# Patient Record
Sex: MALE | Race: WHITE | URBAN - METROPOLITAN AREA
[De-identification: names, ages, dates, MRNs, and addresses within clinical notes are randomized per-mention and may not be internally consistent; named-entity substitution may affect disease eponyms.]

---

## 2022-10-07 ENCOUNTER — HOSPITAL ENCOUNTER (INPATIENT)
Age: 52
LOS: 2 days | Discharge: HOME | DRG: 247 | End: 2022-10-09
Attending: EMERGENCY MEDICINE | Admitting: INTERNAL MEDICINE

## 2022-10-07 DIAGNOSIS — I21.3 ST ELEVATION MYOCARDIAL INFARCTION (STEMI), UNSPECIFIED ARTERY (HCC): Primary | ICD-10-CM

## 2022-10-07 PROBLEM — D72.9 NEUTROPHILIC LEUKOCYTOSIS: Status: ACTIVE | Noted: 2022-10-07

## 2022-10-07 PROBLEM — R03.0 ELEVATED BLOOD PRESSURE READING WITHOUT DIAGNOSIS OF HYPERTENSION: Status: ACTIVE | Noted: 2022-10-07

## 2022-10-07 PROBLEM — E87.6 HYPOKALEMIA: Status: ACTIVE | Noted: 2022-10-07

## 2022-10-07 PROBLEM — D72.828 NEUTROPHILIC LEUKOCYTOSIS: Status: ACTIVE | Noted: 2022-10-07

## 2022-10-07 PROBLEM — R73.9 HYPERGLYCEMIA: Status: ACTIVE | Noted: 2022-10-07

## 2022-10-07 PROBLEM — I21.19 ACUTE ST ELEVATION MYOCARDIAL INFARCTION (STEMI) INVOLVING OTHER CORONARY ARTERY OF INFERIOR WALL (HCC): Status: ACTIVE | Noted: 2022-10-07

## 2022-10-07 LAB
ANION GAP SERPL CALCULATED.3IONS-SCNC: 13 MMOL/L (ref 3–16)
APTT: 25.8 SEC (ref 23–34.3)
BASOPHILS ABSOLUTE: 0.1 K/UL (ref 0–0.2)
BASOPHILS RELATIVE PERCENT: 0.6 %
BUN BLDV-MCNC: 14 MG/DL (ref 7–20)
CALCIUM SERPL-MCNC: 9 MG/DL (ref 8.3–10.6)
CHLORIDE BLD-SCNC: 99 MMOL/L (ref 99–110)
CO2: 23 MMOL/L (ref 21–32)
CREAT SERPL-MCNC: 0.9 MG/DL (ref 0.9–1.3)
EKG ATRIAL RATE: 56 BPM
EKG DIAGNOSIS: NORMAL
EKG P AXIS: 65 DEGREES
EKG P-R INTERVAL: 166 MS
EKG Q-T INTERVAL: 376 MS
EKG QRS DURATION: 84 MS
EKG QTC CALCULATION (BAZETT): 362 MS
EKG R AXIS: 48 DEGREES
EKG T AXIS: 72 DEGREES
EKG VENTRICULAR RATE: 56 BPM
EOSINOPHILS ABSOLUTE: 0.5 K/UL (ref 0–0.6)
EOSINOPHILS RELATIVE PERCENT: 2.7 %
GFR AFRICAN AMERICAN: >60
GFR NON-AFRICAN AMERICAN: >60
GLUCOSE BLD-MCNC: 144 MG/DL (ref 70–99)
HCT VFR BLD CALC: 46.2 % (ref 40.5–52.5)
HEMOGLOBIN: 15.5 G/DL (ref 13.5–17.5)
INR BLD: 1.05 (ref 0.87–1.14)
LYMPHOCYTES ABSOLUTE: 6.4 K/UL (ref 1–5.1)
LYMPHOCYTES RELATIVE PERCENT: 34.3 %
MAGNESIUM: 2 MG/DL (ref 1.8–2.4)
MCH RBC QN AUTO: 32.1 PG (ref 26–34)
MCHC RBC AUTO-ENTMCNC: 33.6 G/DL (ref 31–36)
MCV RBC AUTO: 95.6 FL (ref 80–100)
MONOCYTES ABSOLUTE: 1.4 K/UL (ref 0–1.3)
MONOCYTES RELATIVE PERCENT: 7.2 %
NEUTROPHILS ABSOLUTE: 10.4 K/UL (ref 1.7–7.7)
NEUTROPHILS RELATIVE PERCENT: 55.2 %
PDW BLD-RTO: 12.8 % (ref 12.4–15.4)
PLATELET # BLD: 375 K/UL (ref 135–450)
PMV BLD AUTO: 9.1 FL (ref 5–10.5)
POTASSIUM REFLEX MAGNESIUM: 3.4 MMOL/L (ref 3.5–5.1)
PROTHROMBIN TIME: 13.6 SEC (ref 11.7–14.5)
RBC # BLD: 4.83 M/UL (ref 4.2–5.9)
SODIUM BLD-SCNC: 135 MMOL/L (ref 136–145)
TROPONIN: <0.01 NG/ML
WBC # BLD: 18.8 K/UL (ref 4–11)

## 2022-10-07 PROCEDURE — 85730 THROMBOPLASTIN TIME PARTIAL: CPT

## 2022-10-07 PROCEDURE — 2500000003 HC RX 250 WO HCPCS

## 2022-10-07 PROCEDURE — C1874 STENT, COATED/COV W/DEL SYS: HCPCS

## 2022-10-07 PROCEDURE — 6360000002 HC RX W HCPCS

## 2022-10-07 PROCEDURE — 2709999900 HC NON-CHARGEABLE SUPPLY

## 2022-10-07 PROCEDURE — 6360000004 HC RX CONTRAST MEDICATION: Performed by: STUDENT IN AN ORGANIZED HEALTH CARE EDUCATION/TRAINING PROGRAM

## 2022-10-07 PROCEDURE — 99152 MOD SED SAME PHYS/QHP 5/>YRS: CPT

## 2022-10-07 PROCEDURE — 6370000000 HC RX 637 (ALT 250 FOR IP): Performed by: STUDENT IN AN ORGANIZED HEALTH CARE EDUCATION/TRAINING PROGRAM

## 2022-10-07 PROCEDURE — 96374 THER/PROPH/DIAG INJ IV PUSH: CPT

## 2022-10-07 PROCEDURE — B2111ZZ FLUOROSCOPY OF MULTIPLE CORONARY ARTERIES USING LOW OSMOLAR CONTRAST: ICD-10-PCS | Performed by: STUDENT IN AN ORGANIZED HEALTH CARE EDUCATION/TRAINING PROGRAM

## 2022-10-07 PROCEDURE — 92941 PRQ TRLML REVSC TOT OCCL AMI: CPT

## 2022-10-07 PROCEDURE — C1725 CATH, TRANSLUMIN NON-LASER: HCPCS

## 2022-10-07 PROCEDURE — C1769 GUIDE WIRE: HCPCS

## 2022-10-07 PROCEDURE — 99285 EMERGENCY DEPT VISIT HI MDM: CPT

## 2022-10-07 PROCEDURE — 80048 BASIC METABOLIC PNL TOTAL CA: CPT

## 2022-10-07 PROCEDURE — 85347 COAGULATION TIME ACTIVATED: CPT

## 2022-10-07 PROCEDURE — 83735 ASSAY OF MAGNESIUM: CPT

## 2022-10-07 PROCEDURE — 84484 ASSAY OF TROPONIN QUANT: CPT

## 2022-10-07 PROCEDURE — 93005 ELECTROCARDIOGRAM TRACING: CPT | Performed by: STUDENT IN AN ORGANIZED HEALTH CARE EDUCATION/TRAINING PROGRAM

## 2022-10-07 PROCEDURE — 85025 COMPLETE CBC W/AUTO DIFF WBC: CPT

## 2022-10-07 PROCEDURE — 4A023N7 MEASUREMENT OF CARDIAC SAMPLING AND PRESSURE, LEFT HEART, PERCUTANEOUS APPROACH: ICD-10-PCS | Performed by: STUDENT IN AN ORGANIZED HEALTH CARE EDUCATION/TRAINING PROGRAM

## 2022-10-07 PROCEDURE — 85610 PROTHROMBIN TIME: CPT

## 2022-10-07 PROCEDURE — 027034Z DILATION OF CORONARY ARTERY, ONE ARTERY WITH DRUG-ELUTING INTRALUMINAL DEVICE, PERCUTANEOUS APPROACH: ICD-10-PCS | Performed by: STUDENT IN AN ORGANIZED HEALTH CARE EDUCATION/TRAINING PROGRAM

## 2022-10-07 PROCEDURE — 93454 CORONARY ARTERY ANGIO S&I: CPT

## 2022-10-07 PROCEDURE — 2000000000 HC ICU R&B

## 2022-10-07 PROCEDURE — 99153 MOD SED SAME PHYS/QHP EA: CPT

## 2022-10-07 PROCEDURE — 36415 COLL VENOUS BLD VENIPUNCTURE: CPT

## 2022-10-07 PROCEDURE — 6370000000 HC RX 637 (ALT 250 FOR IP)

## 2022-10-07 PROCEDURE — 2580000003 HC RX 258

## 2022-10-07 PROCEDURE — C1887 CATHETER, GUIDING: HCPCS

## 2022-10-07 PROCEDURE — 6360000002 HC RX W HCPCS: Performed by: STUDENT IN AN ORGANIZED HEALTH CARE EDUCATION/TRAINING PROGRAM

## 2022-10-07 RX ORDER — ONDANSETRON 4 MG/1
4 TABLET, ORALLY DISINTEGRATING ORAL EVERY 8 HOURS PRN
Status: DISCONTINUED | OUTPATIENT
Start: 2022-10-07 | End: 2022-10-09 | Stop reason: HOSPADM

## 2022-10-07 RX ORDER — CLOPIDOGREL BISULFATE 75 MG/1
75 TABLET ORAL DAILY
Status: DISCONTINUED | OUTPATIENT
Start: 2022-10-07 | End: 2022-10-07

## 2022-10-07 RX ORDER — CARVEDILOL 6.25 MG/1
3.12 TABLET ORAL 2 TIMES DAILY WITH MEALS
Status: DISCONTINUED | OUTPATIENT
Start: 2022-10-07 | End: 2022-10-07

## 2022-10-07 RX ORDER — ACETAMINOPHEN 650 MG/1
650 SUPPOSITORY RECTAL EVERY 6 HOURS PRN
Status: DISCONTINUED | OUTPATIENT
Start: 2022-10-07 | End: 2022-10-07 | Stop reason: SDUPTHER

## 2022-10-07 RX ORDER — ACETAMINOPHEN 650 MG/1
650 SUPPOSITORY RECTAL EVERY 6 HOURS PRN
Status: DISCONTINUED | OUTPATIENT
Start: 2022-10-07 | End: 2022-10-09 | Stop reason: HOSPADM

## 2022-10-07 RX ORDER — ENOXAPARIN SODIUM 100 MG/ML
40 INJECTION SUBCUTANEOUS DAILY
Status: DISCONTINUED | OUTPATIENT
Start: 2022-10-08 | End: 2022-10-07

## 2022-10-07 RX ORDER — LISINOPRIL 10 MG/1
5 TABLET ORAL DAILY
Status: DISCONTINUED | OUTPATIENT
Start: 2022-10-07 | End: 2022-10-09 | Stop reason: HOSPADM

## 2022-10-07 RX ORDER — MAGNESIUM HYDROXIDE/ALUMINUM HYDROXICE/SIMETHICONE 120; 1200; 1200 MG/30ML; MG/30ML; MG/30ML
30 SUSPENSION ORAL EVERY 6 HOURS PRN
Status: DISCONTINUED | OUTPATIENT
Start: 2022-10-07 | End: 2022-10-09 | Stop reason: HOSPADM

## 2022-10-07 RX ORDER — SODIUM CHLORIDE 9 MG/ML
INJECTION, SOLUTION INTRAVENOUS PRN
Status: DISCONTINUED | OUTPATIENT
Start: 2022-10-07 | End: 2022-10-09 | Stop reason: HOSPADM

## 2022-10-07 RX ORDER — ASPIRIN 81 MG/1
81 TABLET, CHEWABLE ORAL DAILY
Status: DISCONTINUED | OUTPATIENT
Start: 2022-10-08 | End: 2022-10-09 | Stop reason: HOSPADM

## 2022-10-07 RX ORDER — POLYETHYLENE GLYCOL 3350 17 G/17G
17 POWDER, FOR SOLUTION ORAL DAILY PRN
Status: DISCONTINUED | OUTPATIENT
Start: 2022-10-07 | End: 2022-10-07

## 2022-10-07 RX ORDER — HEPARIN SODIUM 1000 [USP'U]/ML
4000 INJECTION, SOLUTION INTRAVENOUS; SUBCUTANEOUS ONCE
Status: COMPLETED | OUTPATIENT
Start: 2022-10-07 | End: 2022-10-07

## 2022-10-07 RX ORDER — ACETAMINOPHEN 325 MG/1
650 TABLET ORAL EVERY 6 HOURS PRN
Status: DISCONTINUED | OUTPATIENT
Start: 2022-10-07 | End: 2022-10-09 | Stop reason: HOSPADM

## 2022-10-07 RX ORDER — FAMOTIDINE 20 MG/1
20 TABLET, FILM COATED ORAL 2 TIMES DAILY
Status: DISCONTINUED | OUTPATIENT
Start: 2022-10-08 | End: 2022-10-09 | Stop reason: HOSPADM

## 2022-10-07 RX ORDER — ATORVASTATIN CALCIUM 80 MG/1
80 TABLET, FILM COATED ORAL NIGHTLY
Status: DISCONTINUED | OUTPATIENT
Start: 2022-10-07 | End: 2022-10-09 | Stop reason: HOSPADM

## 2022-10-07 RX ORDER — POLYETHYLENE GLYCOL 3350 17 G/17G
17 POWDER, FOR SOLUTION ORAL DAILY PRN
Status: DISCONTINUED | OUTPATIENT
Start: 2022-10-07 | End: 2022-10-09 | Stop reason: HOSPADM

## 2022-10-07 RX ORDER — MORPHINE SULFATE 4 MG/ML
4 INJECTION, SOLUTION INTRAMUSCULAR; INTRAVENOUS ONCE
Status: COMPLETED | OUTPATIENT
Start: 2022-10-07 | End: 2022-10-07

## 2022-10-07 RX ORDER — POTASSIUM CHLORIDE 20 MEQ/1
40 TABLET, EXTENDED RELEASE ORAL PRN
Status: DISCONTINUED | OUTPATIENT
Start: 2022-10-07 | End: 2022-10-09 | Stop reason: HOSPADM

## 2022-10-07 RX ORDER — ACETAMINOPHEN 325 MG/1
650 TABLET ORAL EVERY 6 HOURS PRN
Status: DISCONTINUED | OUTPATIENT
Start: 2022-10-07 | End: 2022-10-07 | Stop reason: SDUPTHER

## 2022-10-07 RX ORDER — SODIUM CHLORIDE 0.9 % (FLUSH) 0.9 %
5-40 SYRINGE (ML) INJECTION EVERY 12 HOURS SCHEDULED
Status: DISCONTINUED | OUTPATIENT
Start: 2022-10-07 | End: 2022-10-09 | Stop reason: HOSPADM

## 2022-10-07 RX ORDER — POTASSIUM CHLORIDE 7.45 MG/ML
10 INJECTION INTRAVENOUS PRN
Status: DISCONTINUED | OUTPATIENT
Start: 2022-10-07 | End: 2022-10-09 | Stop reason: HOSPADM

## 2022-10-07 RX ORDER — ATORVASTATIN CALCIUM 40 MG/1
40 TABLET, FILM COATED ORAL NIGHTLY
Status: DISCONTINUED | OUTPATIENT
Start: 2022-10-07 | End: 2022-10-07

## 2022-10-07 RX ORDER — SODIUM CHLORIDE 0.9 % (FLUSH) 0.9 %
5-40 SYRINGE (ML) INJECTION PRN
Status: DISCONTINUED | OUTPATIENT
Start: 2022-10-07 | End: 2022-10-09 | Stop reason: HOSPADM

## 2022-10-07 RX ORDER — MAGNESIUM SULFATE IN WATER 40 MG/ML
2000 INJECTION, SOLUTION INTRAVENOUS PRN
Status: DISCONTINUED | OUTPATIENT
Start: 2022-10-07 | End: 2022-10-09 | Stop reason: HOSPADM

## 2022-10-07 RX ORDER — SODIUM CHLORIDE 0.9 % (FLUSH) 0.9 %
5-40 SYRINGE (ML) INJECTION PRN
Status: DISCONTINUED | OUTPATIENT
Start: 2022-10-07 | End: 2022-10-07 | Stop reason: SDUPTHER

## 2022-10-07 RX ORDER — SODIUM CHLORIDE 9 MG/ML
INJECTION, SOLUTION INTRAVENOUS PRN
Status: DISCONTINUED | OUTPATIENT
Start: 2022-10-07 | End: 2022-10-07 | Stop reason: SDUPTHER

## 2022-10-07 RX ORDER — ONDANSETRON 2 MG/ML
4 INJECTION INTRAMUSCULAR; INTRAVENOUS EVERY 6 HOURS PRN
Status: DISCONTINUED | OUTPATIENT
Start: 2022-10-07 | End: 2022-10-09 | Stop reason: HOSPADM

## 2022-10-07 RX ORDER — ENOXAPARIN SODIUM 100 MG/ML
40 INJECTION SUBCUTANEOUS DAILY
Status: DISCONTINUED | OUTPATIENT
Start: 2022-10-08 | End: 2022-10-09 | Stop reason: HOSPADM

## 2022-10-07 RX ORDER — SODIUM CHLORIDE 0.9 % (FLUSH) 0.9 %
5-40 SYRINGE (ML) INJECTION EVERY 12 HOURS SCHEDULED
Status: DISCONTINUED | OUTPATIENT
Start: 2022-10-07 | End: 2022-10-07 | Stop reason: SDUPTHER

## 2022-10-07 RX ADMIN — HEPARIN SODIUM 4000 UNITS: 1000 INJECTION INTRAVENOUS; SUBCUTANEOUS at 19:10

## 2022-10-07 RX ADMIN — SODIUM CHLORIDE, PRESERVATIVE FREE 10 ML: 5 INJECTION INTRAVENOUS at 21:35

## 2022-10-07 RX ADMIN — IOHEXOL 200 ML: 350 INJECTION, SOLUTION INTRAVENOUS at 19:59

## 2022-10-07 RX ADMIN — ATORVASTATIN CALCIUM 80 MG: 80 TABLET, FILM COATED ORAL at 21:35

## 2022-10-07 RX ADMIN — TICAGRELOR 180 MG: 90 TABLET ORAL at 19:11

## 2022-10-07 RX ADMIN — MORPHINE SULFATE 4 MG: 4 INJECTION INTRAVENOUS at 19:08

## 2022-10-07 RX ADMIN — LISINOPRIL 5 MG: 10 TABLET ORAL at 21:35

## 2022-10-07 ASSESSMENT — ENCOUNTER SYMPTOMS
STRIDOR: 0
EYES NEGATIVE: 1
CHEST TIGHTNESS: 1
WHEEZING: 0
CONSTIPATION: 0
ABDOMINAL PAIN: 0
SHORTNESS OF BREATH: 1
CHEST TIGHTNESS: 0
DIARRHEA: 0
SHORTNESS OF BREATH: 0
ALLERGIC/IMMUNOLOGIC NEGATIVE: 1
GASTROINTESTINAL NEGATIVE: 1
NAUSEA: 0
COUGH: 0

## 2022-10-07 ASSESSMENT — PAIN - FUNCTIONAL ASSESSMENT: PAIN_FUNCTIONAL_ASSESSMENT: 0-10

## 2022-10-07 NOTE — ED PROVIDER NOTES
ED Attending Attestation Note     Date of evaluation: 10/7/2022    This patient was seen by the resident. I have seen and examined the patient, agree with the workup, evaluation, management and diagnosis. The care plan has been discussed. I have reviewed the ECG and concur with the resident's interpretation. My assessment reveals a 53-year-old male who presents with a chief complaint of chest pain. Prehospital EKG concerning for inferior STEMI. STEMI and Cath Lab were activated prior to patient's arrival.  I spoke with cardiologist who agreed with going to the Cath Lab. Requested heparin, aspirin, Brilinta. Aspirin given prehospital.  Heparin and Brilinta ordered. Patient received these in the ER. Patient on exam has a normal heart and lung exam, appears uncomfortable but not diaphoretic. Kasia Avalos CRITICAL CARE TIME    Upon my evaluation, this patient had a critical illness or injury that acutely impaired one or more vital organ systems such that there was a high probability of imminent or life threatening deterioration without intervention. In this patient that illness with a high probability of imminent or life threatening deterioration was STEMI, which required my direct attention, intervention, and personal management. I have personally provided 20 minutes of critical care time exclusive of separately billed procedures and treating other patients. Critical care was necessary to treat or prevent imminent or life threatening deterioration of the following condition(s):  STEMI requiring cath lab activation . Critical care time was spent personally by me on the following activities:  This critical care time included review of prehospital EKG, discussion with interventional cardiologist, examining the patient; pulse oximetry; arranging urgent treatment with development of a management plan; evaluation of patient's response to treatment; frequent reassessment; and, discussions with other providers including consultants.        Johannah Cushing, MD  10/07/22 0817

## 2022-10-07 NOTE — ED PROVIDER NOTES
[address] [facility] RESIDENT NOTE       Date of evaluation: 10/7/2022    Chief Complaint     Chest Pain      of Present Illness     Jody Wan is a 45 y.o. male with no significant known past medical history who presents to the emergency department with 8 out of 10 midsternal chest pain that onset 10 minutes prior to arrival.  States he has never had this pain before. Does not follow-up with doctors regularly, but has never been told he has heart disease, hypertension, diabetes. Also reports shortness of breath but this pain. Otherwise he has been feeling well. No fevers, nausea, vomiting, abdominal pain, any other concerns. EKG in route showed inferior STEMI. Cath Lab was activated prior to arrival.  He was given 325 mg aspirin in route. Review of Systems     Review of Systems   HENT: Negative. Eyes: Negative. Respiratory:  Positive for chest tightness and shortness of breath. Negative for cough, wheezing and stridor. Cardiovascular:  Positive for chest pain. Negative for palpitations and leg swelling. Gastrointestinal: Negative. Endocrine: Negative. Genitourinary: Negative. Musculoskeletal: Negative. Skin: Negative. Allergic/Immunologic: Negative. Neurological: Negative. Hematological: Negative. Psychiatric/Behavioral: Negative. Past Medical, Surgical, Family, and Social History     He has no past medical history on file. He has a past surgical history that includes Testicle removal.  His family history is not on file. He reports that he has been smoking cigarettes. He has been smoking an average of 1.5 packs per day. He has never used smokeless tobacco. He reports that he does not currently use alcohol. He reports that he does not use drugs. Medications     Previous Medications    IBUPROFEN (IBU) 600 MG TABLET    Take 1 tablet by mouth every 6 hours as needed for Pain.        Allergies     He is allergic to tetanus toxoids. Physical Exam     INITIAL VITALS: BP: (!) 151/99,  , Heart Rate: 56, Resp: 16, SpO2: 97 %   Physical Exam  Vitals and nursing note reviewed. Constitutional:       General: He is not in acute distress. Appearance: Normal appearance. He is not ill-appearing, toxic-appearing or diaphoretic. Comments: Appears to be in distress secondary to pain   HENT:      Head: Normocephalic and atraumatic. Nose: Nose normal.      Mouth/Throat:      Mouth: Mucous membranes are moist.      Pharynx: Oropharynx is clear. Eyes:      Extraocular Movements: Extraocular movements intact. Pupils: Pupils are equal, round, and reactive to light. Cardiovascular:      Rate and Rhythm: Normal rate and regular rhythm. Pulses: Normal pulses. Heart sounds: No murmur heard. Pulmonary:      Effort: Pulmonary effort is normal.      Breath sounds: Normal breath sounds. Abdominal:      Palpations: Abdomen is soft. Tenderness: no abdominal tenderness   Musculoskeletal:         General: Normal range of motion. Cervical back: Normal range of motion and neck supple. Skin:     General: Skin is warm and dry. Capillary Refill: Capillary refill takes less than 2 seconds. Neurological:      General: No focal deficit present. Mental Status: He is alert and oriented to person, place, and time. Psychiatric:         Mood and Affect: Mood normal.         Behavior: Behavior normal.       DiagnosticResults     EKG   Interpreted in conjunction with emergencydepartment physician Kristen Garrett MD  Sinus bradycardia with ventricular rate of 56 bpm.  MD interval 166 ms. QTc 362 ms. Acute ST elevations in leads II, 3, aVF. ST depression in V2. Normal axis.     RADIOLOGY:  No orders to display       LABS:   Results for orders placed or performed during the hospital encounter of 10/07/22   EKG 12 Lead   Result Value Ref Range    Ventricular Rate 56 BPM    Atrial Rate 56 BPM    P-R Interval 166 ms    QRS Duration 84 ms    Q-T Interval 376 ms    QTc Calculation (Bazett) 362 ms    P Axis 65 degrees    R Axis 48 degrees    T Axis 72 degrees    Diagnosis       EKG performed in ER and to be interpreted by ER physician. Confirmed by MD, ER ([id number]),  Brian Zuñiga ([phone]) on 10/7/2022 7:03:53 PM       ED BEDSIDE ULTRASOUND:  No results found. RECENT VITALS:  BP: (!) 151/99,  , Heart Rate: 56,Resp: 16, SpO2: 97 %     Procedures       ED Course     Nursing Notes, Past Medical Hx, Past Surgical Hx, Social Hx, Allergies, and Family Hx were reviewed. The patient was given the followingmedications:  Orders Placed This Encounter   Medications    ticagrelor (BRILINTA) tablet 180 mg    heparin (porcine) injection 4,000 Units    morphine injection 4 mg       CONSULTS:  Tonia Govea / JOAQUIN / Keylanohemidulce Louie is a 45 y.o. male presenting to the emergency department with STEMI on EKG. He is hemodynamically stable. Cath Lab was activated. Patient was given aspirin in route. Given Brilinta and heparin here per cardiology recommendations. Given morphine for pain upon arrival.  Patient taken to Cath Lab. Case was discussed with medicine. Patient will require ICU admission after Cath Lab. He was accepted for admission. This patient was also evaluated by the attending physician. All care plans werediscussed and agreed upon. Clinical Impression     1. ST elevation myocardial infarction (STEMI), unspecified artery (HCC)        Disposition     PATIENT REFERRED TO:  No follow-up provider specified.     DISCHARGE MEDICATIONS:  New Prescriptions    No medications on file       Sendy Weiss MD  Resident  10/07/22 2026

## 2022-10-08 ENCOUNTER — APPOINTMENT (OUTPATIENT)
Dept: GENERAL RADIOLOGY | Age: 52
DRG: 247 | End: 2022-10-08

## 2022-10-08 PROBLEM — Z72.0 TOBACCO USE: Status: ACTIVE | Noted: 2022-10-08

## 2022-10-08 LAB
A/G RATIO: 1.7 (ref 1.1–2.2)
ALBUMIN SERPL-MCNC: 4.2 G/DL (ref 3.4–5)
ALP BLD-CCNC: 99 U/L (ref 40–129)
ALT SERPL-CCNC: 41 U/L (ref 10–40)
ANION GAP SERPL CALCULATED.3IONS-SCNC: 12 MMOL/L (ref 3–16)
AST SERPL-CCNC: 172 U/L (ref 15–37)
BASOPHILS ABSOLUTE: 0.1 K/UL (ref 0–0.2)
BASOPHILS RELATIVE PERCENT: 0.3 %
BILIRUB SERPL-MCNC: 0.5 MG/DL (ref 0–1)
BUN BLDV-MCNC: 12 MG/DL (ref 7–20)
CALCIUM SERPL-MCNC: 8.9 MG/DL (ref 8.3–10.6)
CHLORIDE BLD-SCNC: 102 MMOL/L (ref 99–110)
CHOLESTEROL, TOTAL: 170 MG/DL (ref 0–199)
CO2: 22 MMOL/L (ref 21–32)
CREAT SERPL-MCNC: 0.8 MG/DL (ref 0.9–1.3)
EOSINOPHILS ABSOLUTE: 0.1 K/UL (ref 0–0.6)
EOSINOPHILS RELATIVE PERCENT: 0.3 %
ESTIMATED AVERAGE GLUCOSE: 116.9 MG/DL
GFR AFRICAN AMERICAN: >60
GFR NON-AFRICAN AMERICAN: >60
GLUCOSE BLD-MCNC: 141 MG/DL (ref 70–99)
HBA1C MFR BLD: 5.7 %
HCT VFR BLD CALC: 43.6 % (ref 40.5–52.5)
HDLC SERPL-MCNC: 31 MG/DL (ref 40–60)
HEMOGLOBIN: 14.8 G/DL (ref 13.5–17.5)
LDL CHOLESTEROL CALCULATED: 116 MG/DL
LV EF: 53 %
LVEF MODALITY: NORMAL
LYMPHOCYTES ABSOLUTE: 3.2 K/UL (ref 1–5.1)
LYMPHOCYTES RELATIVE PERCENT: 17.7 %
MAGNESIUM: 2 MG/DL (ref 1.8–2.4)
MCH RBC QN AUTO: 32.6 PG (ref 26–34)
MCHC RBC AUTO-ENTMCNC: 34 G/DL (ref 31–36)
MCV RBC AUTO: 95.8 FL (ref 80–100)
MONOCYTES ABSOLUTE: 1.3 K/UL (ref 0–1.3)
MONOCYTES RELATIVE PERCENT: 7.1 %
NEUTROPHILS ABSOLUTE: 13.5 K/UL (ref 1.7–7.7)
NEUTROPHILS RELATIVE PERCENT: 74.6 %
PDW BLD-RTO: 12.9 % (ref 12.4–15.4)
PLATELET # BLD: 287 K/UL (ref 135–450)
PMV BLD AUTO: 8.6 FL (ref 5–10.5)
POTASSIUM REFLEX MAGNESIUM: 4.1 MMOL/L (ref 3.5–5.1)
RBC # BLD: 4.56 M/UL (ref 4.2–5.9)
SODIUM BLD-SCNC: 136 MMOL/L (ref 136–145)
TOTAL PROTEIN: 6.7 G/DL (ref 6.4–8.2)
TRIGL SERPL-MCNC: 113 MG/DL (ref 0–150)
TROPONIN: 3.88 NG/ML
VLDLC SERPL CALC-MCNC: 23 MG/DL
WBC # BLD: 18.2 K/UL (ref 4–11)

## 2022-10-08 PROCEDURE — 6370000000 HC RX 637 (ALT 250 FOR IP): Performed by: STUDENT IN AN ORGANIZED HEALTH CARE EDUCATION/TRAINING PROGRAM

## 2022-10-08 PROCEDURE — 6360000002 HC RX W HCPCS

## 2022-10-08 PROCEDURE — 71045 X-RAY EXAM CHEST 1 VIEW: CPT

## 2022-10-08 PROCEDURE — 6370000000 HC RX 637 (ALT 250 FOR IP): Performed by: NURSE PRACTITIONER

## 2022-10-08 PROCEDURE — 84484 ASSAY OF TROPONIN QUANT: CPT

## 2022-10-08 PROCEDURE — 1200000000 HC SEMI PRIVATE

## 2022-10-08 PROCEDURE — 2580000003 HC RX 258

## 2022-10-08 PROCEDURE — 80061 LIPID PANEL: CPT

## 2022-10-08 PROCEDURE — 94150 VITAL CAPACITY TEST: CPT

## 2022-10-08 PROCEDURE — 36415 COLL VENOUS BLD VENIPUNCTURE: CPT

## 2022-10-08 PROCEDURE — 83735 ASSAY OF MAGNESIUM: CPT

## 2022-10-08 PROCEDURE — 6370000000 HC RX 637 (ALT 250 FOR IP)

## 2022-10-08 PROCEDURE — 93306 TTE W/DOPPLER COMPLETE: CPT

## 2022-10-08 PROCEDURE — 85025 COMPLETE CBC W/AUTO DIFF WBC: CPT

## 2022-10-08 PROCEDURE — 83036 HEMOGLOBIN GLYCOSYLATED A1C: CPT

## 2022-10-08 PROCEDURE — 93005 ELECTROCARDIOGRAM TRACING: CPT | Performed by: STUDENT IN AN ORGANIZED HEALTH CARE EDUCATION/TRAINING PROGRAM

## 2022-10-08 PROCEDURE — 80053 COMPREHEN METABOLIC PANEL: CPT

## 2022-10-08 RX ORDER — ASPIRIN 81 MG/1
81 TABLET, CHEWABLE ORAL DAILY
Qty: 30 TABLET | Refills: 3 | Status: SHIPPED | OUTPATIENT
Start: 2022-10-09

## 2022-10-08 RX ORDER — CLOPIDOGREL BISULFATE 75 MG/1
300 TABLET ORAL ONCE
Status: COMPLETED | OUTPATIENT
Start: 2022-10-08 | End: 2022-10-08

## 2022-10-08 RX ORDER — LISINOPRIL 5 MG/1
5 TABLET ORAL DAILY
Qty: 30 TABLET | Refills: 3 | Status: SHIPPED | OUTPATIENT
Start: 2022-10-09

## 2022-10-08 RX ORDER — METOPROLOL SUCCINATE 25 MG/1
25 TABLET, EXTENDED RELEASE ORAL DAILY
Qty: 30 TABLET | Refills: 3 | Status: SHIPPED | OUTPATIENT
Start: 2022-10-08

## 2022-10-08 RX ORDER — NITROGLYCERIN 0.4 MG/1
0.4 TABLET SUBLINGUAL ONCE
Status: COMPLETED | OUTPATIENT
Start: 2022-10-08 | End: 2022-10-08

## 2022-10-08 RX ORDER — ATORVASTATIN CALCIUM 80 MG/1
80 TABLET, FILM COATED ORAL NIGHTLY
Qty: 30 TABLET | Refills: 3 | Status: SHIPPED | OUTPATIENT
Start: 2022-10-08

## 2022-10-08 RX ORDER — CLOPIDOGREL BISULFATE 75 MG/1
75 TABLET ORAL DAILY
Qty: 30 TABLET | Refills: 3 | Status: SHIPPED | OUTPATIENT
Start: 2022-10-09

## 2022-10-08 RX ORDER — METOPROLOL SUCCINATE 25 MG/1
25 TABLET, EXTENDED RELEASE ORAL DAILY
Status: DISCONTINUED | OUTPATIENT
Start: 2022-10-08 | End: 2022-10-09 | Stop reason: HOSPADM

## 2022-10-08 RX ORDER — CLOPIDOGREL BISULFATE 75 MG/1
75 TABLET ORAL DAILY
Status: DISCONTINUED | OUTPATIENT
Start: 2022-10-09 | End: 2022-10-09 | Stop reason: HOSPADM

## 2022-10-08 RX ADMIN — TICAGRELOR 90 MG: 90 TABLET ORAL at 00:36

## 2022-10-08 RX ADMIN — ASPIRIN 81 MG 81 MG: 81 TABLET ORAL at 09:50

## 2022-10-08 RX ADMIN — FAMOTIDINE 20 MG: 20 TABLET ORAL at 09:50

## 2022-10-08 RX ADMIN — FAMOTIDINE 20 MG: 20 TABLET ORAL at 20:57

## 2022-10-08 RX ADMIN — BENZOCAINE 6 MG-MENTHOL 10 MG LOZENGES 1 LOZENGE: at 05:14

## 2022-10-08 RX ADMIN — SODIUM CHLORIDE, PRESERVATIVE FREE 10 ML: 5 INJECTION INTRAVENOUS at 20:57

## 2022-10-08 RX ADMIN — ENOXAPARIN SODIUM 40 MG: 100 INJECTION SUBCUTANEOUS at 09:53

## 2022-10-08 RX ADMIN — CLOPIDOGREL BISULFATE 300 MG: 75 TABLET ORAL at 12:22

## 2022-10-08 RX ADMIN — ATORVASTATIN CALCIUM 80 MG: 80 TABLET, FILM COATED ORAL at 20:57

## 2022-10-08 RX ADMIN — NITROGLYCERIN 0.4 MG: 0.4 TABLET, ORALLY DISINTEGRATING SUBLINGUAL at 09:13

## 2022-10-08 RX ADMIN — LIDOCAINE HYDROCHLORIDE: 20 SOLUTION ORAL; TOPICAL at 12:21

## 2022-10-08 RX ADMIN — METOPROLOL SUCCINATE 25 MG: 25 TABLET, FILM COATED, EXTENDED RELEASE ORAL at 12:22

## 2022-10-08 RX ADMIN — TICAGRELOR 90 MG: 90 TABLET ORAL at 09:50

## 2022-10-08 RX ADMIN — SODIUM CHLORIDE, PRESERVATIVE FREE 10 ML: 5 INJECTION INTRAVENOUS at 09:53

## 2022-10-08 RX ADMIN — LISINOPRIL 5 MG: 10 TABLET ORAL at 09:50

## 2022-10-08 ASSESSMENT — PAIN DESCRIPTION - LOCATION
LOCATION: THROAT
LOCATION: CHEST
LOCATION: CHEST

## 2022-10-08 ASSESSMENT — PAIN - FUNCTIONAL ASSESSMENT: PAIN_FUNCTIONAL_ASSESSMENT: ACTIVITIES ARE NOT PREVENTED

## 2022-10-08 ASSESSMENT — ENCOUNTER SYMPTOMS
SHORTNESS OF BREATH: 1
ABDOMINAL DISTENTION: 0
PHOTOPHOBIA: 0
APNEA: 0
EYE DISCHARGE: 0

## 2022-10-08 ASSESSMENT — PAIN DESCRIPTION - DESCRIPTORS
DESCRIPTORS: SHARP
DESCRIPTORS: SHARP;OTHER (COMMENT)

## 2022-10-08 ASSESSMENT — PAIN SCALES - GENERAL
PAINLEVEL_OUTOF10: 0
PAINLEVEL_OUTOF10: 4
PAINLEVEL_OUTOF10: 4
PAINLEVEL_OUTOF10: 2
PAINLEVEL_OUTOF10: 3

## 2022-10-08 ASSESSMENT — PAIN DESCRIPTION - PAIN TYPE: TYPE: ACUTE PAIN

## 2022-10-08 ASSESSMENT — PAIN DESCRIPTION - FREQUENCY: FREQUENCY: CONTINUOUS

## 2022-10-08 ASSESSMENT — PAIN DESCRIPTION - ORIENTATION: ORIENTATION: LEFT

## 2022-10-08 NOTE — DISCHARGE INSTRUCTIONS
Please follow up with cardiology in 1 week.   Continue taking the medications you have been prescribed in the hospital.

## 2022-10-08 NOTE — PROGRESS NOTES
Pt stated that he was having \"chest pain\". He stated that he felt a sharp pain in his L chest whenever he breathed in. He stated that it has been happening for about 2hrs and was getting worse now. Pt rated the pain 4/10. Pt's other VSS WNL. This RN spoke with the ICU team and they assessed the Pt at his bedside. A STAT EKG was gotten which read normal. The Pt was given Nitro Sublingual x 1 and the pain was relieved.  Awaiting any new orders from the ICU team as they speak with the cardiologist.

## 2022-10-08 NOTE — PROGRESS NOTES
This RN called lab and asked about troponin level that was drawn around 1020 today. This RN had sent the level via the tube station and asked if there had been a result yet. No result was seen in results review yet, almost an hour after. Chemistry stated that they can see that it was received at 1050 but that they did not know where the tube was. He stated he would get back to me.

## 2022-10-08 NOTE — CONSULTS
[facility].        Chief Complaint   Patient presents with    Chest Pain            History of Present Illness:  Giuseppe Will is a 45 y.o. patient who presented to the hospital with complaints of Chest pain. I have been asked to provide consultation regarding further management and testing. Patient with acute onset chest pain that started about an hour ago - doesn't see doctors unclear true past medical history. EKG on route was consistent with an inferior STEMI - ASA/Ticagrelor/Heparin given in the ED and the patient was brought emergently to the lab    Past Medical History:   has no past medical history on file. Surgical History:   has a past surgical history that includes Testicle removal.     Social History:   reports that he has been smoking cigarettes. He has been smoking an average of 1.5 packs per day. He has never used smokeless tobacco. He reports that he does not currently use alcohol. He reports that he does not use drugs. Family History:  No family history of premature coronary artery disease, aortic disease, or valve disease. Home Medications:  Were reviewed and are listed in nursing record. and/or listed below  Prior to Admission medications    Medication Sig Start Date End Date Taking? Authorizing Provider   ibuprofen (IBU) 600 MG tablet Take 1 tablet by mouth every 6 hours as needed for Pain. 11/1/12   RDAHA Live - CNP        Current Medications:  No current facility-administered medications for this encounter. Current Outpatient Medications   Medication Sig Dispense Refill    ibuprofen (IBU) 600 MG tablet Take 1 tablet by mouth every 6 hours as needed for Pain.  20 tablet 0        Allergies:  Tetanus toxoids     Review of Systems:   + chest pain  All other systems reviewed and negative    Physical Examination:    Vitals:    10/07/22 1858   BP: (!) 151/99   Pulse: 56   Resp: 16   SpO2: 97%      Weight: 200 lb (90.7 kg)       Physical Exam  Vitals and nursing note reviewed. Constitutional:       Appearance: Normal appearance. HENT:      Nose: Nose normal.      Mouth/Throat:      Mouth: Mucous membranes are moist.   Eyes:      Pupils: Pupils are equal, round, and reactive to light. Cardiovascular:      Rate and Rhythm: Regular rhythm. Bradycardia present. Heart sounds: No murmur heard. Pulmonary:      Effort: Pulmonary effort is normal. No respiratory distress. Breath sounds: Normal breath sounds. Abdominal:      General: Abdomen is flat. There is no distension. Tenderness: There is no abdominal tenderness. Musculoskeletal:      Right lower leg: No edema. Left lower leg: No edema. Skin:     General: Skin is warm and dry. Neurological:      General: No focal deficit present. Mental Status: He is alert and oriented to person, place, and time.    Psychiatric:         Mood and Affect: Mood normal.         Behavior: Behavior normal.        Labs  CBC:   Lab Results   Component Value Date/Time    WBC 18.8 10/07/2022 07:32 PM    RBC 4.83 10/07/2022 07:32 PM    HGB 15.5 10/07/2022 07:32 PM    HCT 46.2 10/07/2022 07:32 PM    MCV 95.6 10/07/2022 07:32 PM    RDW 12.8 10/07/2022 07:32 PM     10/07/2022 07:32 PM     CMP:    Lab Results   Component Value Date/Time     10/07/2022 07:32 PM    K 3.4 10/07/2022 07:32 PM    CL 99 10/07/2022 07:32 PM    CO2 23 10/07/2022 07:32 PM    BUN 14 10/07/2022 07:32 PM    CREATININE 0.9 10/07/2022 07:32 PM    GFRAA >60 10/07/2022 07:32 PM    LABGLOM >60 10/07/2022 07:32 PM    GLUCOSE 144 10/07/2022 07:32 PM    CALCIUM 9.0 10/07/2022 07:32 PM     PT/INR:  No results found for: PTINR  Lab Results   Component Value Date    TROPONINI <0.01 10/07/2022       EKG:  I have reviewed EKG with the following interpretation:  Impression:  Inferior STEMI      Old notes reviewed  Telemetry reviewed  Ekg personally reviewed  Chest xray personally reviewed  Echo, cath, and   Medications and labs reviewed    Due to the high probability of clinically significant life threating deterioration of the patient's condition that required my urgent intervention, a total critical care time of >75 minutes was used. This time excludes any time that may have been spent performing procedures. This includes but not limited to vital sign monitoring, telemetry monitoring, continuous pulse oximety, IV medication, clinical response to the IV medications, documentation time , consultation time, interpretation of lab data, review of nursing notes and old record review. Assessment/Plan:  Principal Problem:  STEMI (ST elevation myocardial infarction) ([facility] [address]      Assessment: inferior STEMI s/p PCI with DESx1 to the RCA, doing very well - hemodynamically stable, radial access no acute issues    Plan:   - Admit to ICU  - Continue ASA  - Will need ticagrelor dose at midnight tonight and then again starting at 0900 per normal schedule  - High potency statin atorva 80mg  - Avoid beta blocker next 24 hours if possible  - Formal ECHO in AM  - Cardiac Rehab  - Patient to follow with me in office in 1-2 weeks post D/C      ---    I will address the patient's cardiac risk factors and adjusted pharmacologic treatment as needed. In addition, I have reinforced the need for patient directed risk factor modification. Tobacco use was discussed with the patient and educated on the negative effects. I have asked the patient to not utilize these agents. Thank you for allowing to us to participate in the kyara or Sudheer Kidd. Further evaluation will be based upon the patient's clinical course and testing results. All questions and concerns were addressed to the patient/family. Alternatives to my treatment were discussed. The note was completed using EMR. Every effort was made to ensure accuracy; however, inadvertent computerized transcription errors may be present.     Noy Bowen MD  Interventional Cardiology  10/7/2022  8:02 PM    [facility] [address] [place] [address], [address] [place] [address]  Ph: [phone]  Fax: [phone]    NOTE: This report was transcribed using voice recognition software. Every effort was made to ensure accuracy, however, inadvertent computerized transcription errors may be present.

## 2022-10-08 NOTE — PROGRESS NOTES
Still no call back from lab regarding troponin level that was sent around 1020 today but this RN. No result in results review seen. This RN spoke with the processing person and she stated that she would figure out what happened. After being on hold for a long time she came back and stated that they found the tube and that results would be done in about 30min. She stated that the tube was labeled with an canceled label. This RN labeled the tube with the Pt printed out label from the lab system that had an active order for troponin at time of labeling. Awaiting result.

## 2022-10-08 NOTE — PROCEDURES
Cardiac Catheterization Operative Report    Diandra Toney  [id number]  10/7/2022  Bennett Farooq MD    Patient has a diagnosis of STEMI. He was referred for cardiac catheterization to define coronary anatomy and perform primary PCI. Procedure Details  The risks, benefits, complications, treatment options, and expected outcomes were discussed with the patient. The patient and/or family concurred with the proposed plan, giving informed consent. Patient was brought to the cath lab after IV hydration was begun and oral premedication was given. He was further sedated with fentanyl and versed. He was prepped and draped in the usual manner. Using the modified Seldinger access technique, a 6 Maltese sheath was placed in the Right Radial artery. Heparin was administered. A left heart catheterization was done. Angiograms were also done. Sedation:   Start time: 1930  End time: 2000  2mg Versed / 100mcg Fentanyl  A trained independent observer was present to assist with monitoring sedated patient    Interventions:  PCI performed to the proximal RCA  3.0x22 mm Mora HUNTER  Post dilation with a 3.0mm NC  Excellent post angiographic result with BAILEY 3 flow    After the procedure was completed. sedation was stopped and the sheaths and catheters were all removed. Hemostasis was achieved with manual pressure.     Findings:    Left Main  Normal bifurcation without angiographically significant disease   LAD  Minor luminal irregularities   Circ  Small AV groove vessel  - 1st OM was large with moderate ostial/proximal disease 50%   RCA  Proximal 100% occlusion, culprit for presentation  - Recannulization with wire entry  - 3.0x22mm Resolute Mora HUNTER  - Post dilation with 3.0mm NC balloon    Excellent post angiographic result and BAILEY 3 flow   Post % Stenosis  0   Closure Device  Radial hemostasis   Complications  None     Conclusion:   - PCI performed to the RCA with DESx1  - ASA/Ticagrelor  - Start BB tomorrow  - High potency statin atorvastatin 80mg  - Post PCI follow up with me in 1-2 weeks. Estimated Blood Loss:  minimal         Complications:  None; patient tolerated the procedure well.            Disposition: PACU - hemodynamically stable           Condition: stable    Guzman Linda MD  Interventional Cardiology  10/7/2022  8:48 PM

## 2022-10-08 NOTE — PROGRESS NOTES
Cardiology - PROGRESS NOTE    Admit Date: 10/7/2022     Chief Complaint: inf STEMI     Interval History:   Patient seen and examined and notes reviewed. Patient is being followed for in STEMI. Patient had felt CP and bilat upper extremity pain. States he had an emesis and knew something was wrong. Came to the ED and found to have an inf STEMI. S/p LHC with PCI to RCA. R wrist soft, no hematoma, good pulse. Does c/o R wrist tenderness, some chest discomfort and fatigue. Placed on Brilinta post procedure. Patient does work but he has no insurance. States if Rx is too expensive then he would not get filled. Echo done - pending.      In: 0 [P.O.:590]  Out: 0    Wt Readings from Last 2 Encounters:   10/07/22 200 lb (90.7 kg)         Data:   Scheduled Meds:   Scheduled Meds:   GI cocktail   Oral Once    lisinopril  5 mg Oral Daily    sodium chloride flush  5-40 mL IntraVENous 2 times per day    enoxaparin  40 mg SubCUTAneous Daily    ticagrelor  90 mg Oral BID    aspirin  81 mg Oral Daily    atorvastatin  80 mg Oral Nightly    famotidine  20 mg Oral BID     Continuous Infusions:   sodium chloride       PRN Meds:.Benzocaine-Menthol, ondansetron **OR** ondansetron, acetaminophen **OR** acetaminophen, magnesium sulfate, potassium chloride **OR** potassium alternative oral replacement **OR** potassium chloride, aluminum & magnesium hydroxide-simethicone, sodium chloride flush, sodium chloride, perflutren lipid microspheres, polyethylene glycol  Continuous Infusions:   sodium chloride         Intake/Output Summary (Last 24 hours) at 10/8/2022 1103  Last data filed at 10/8/2022 1035  Gross per 24 hour   Intake 590 ml   Output 0 ml   Net 590 ml       CBC:   Lab Results   Component Value Date/Time    WBC 18.2 10/08/2022 04:21 AM    HGB 14.8 10/08/2022 04:21 AM     10/08/2022 04:21 AM     BMP:  Lab Results   Component Value Date/Time     10/08/2022 04:16 AM    K 4.1 10/08/2022 04:16 AM     10/08/2022 04:16 AM    CO2 22 10/08/2022 04:16 AM    BUN 12 10/08/2022 04:16 AM    CREATININE 0.8 10/08/2022 04:16 AM    GLUCOSE 141 10/08/2022 04:16 AM     INR:   Lab Results   Component Value Date/Time    INR 1.05 10/07/2022 07:32 PM        CARDIAC LABS  ENZYMES:  Recent Labs     10/07/22  1932   TROPONINI <0.01     FASTING LIPID PANEL:No results found for: HDL, LDLDIRECT, LDLCALC, TRIG, TSH  LIVER PROFILE:  Lab Results   Component Value Date/Time     10/08/2022 04:16 AM    ALT 41 10/08/2022 04:16 AM       -----------------------------------------------------------------  Telemetry: Personally reviewed  NSR, 1 6 bt run of NSVT    Objective:   Vitals: /81   Pulse 82   Temp 98.4 °F (36.9 °C)   Resp 21   Ht 5' 10\" (1.778 m)   Wt 200 lb (90.7 kg)   SpO2 98%   BMI 28.70 kg/m²   General appearance: alert, appears stated age and cooperative, No acute distress   Eyes: Conjunctiva and pupils normal and reactive  Skin: Skin color, texture, turgor normal. No rashes or ecchymosis. R wrist - no hematoma  Neck: no JVD, supple, symmetrical, trachea midline   Lungs: , no accessory muscle use, no respiratory distress  Heart: RRR  Abdomen: soft, non-tender; bowel sounds normal  Extremities: No edema, DP +  Psychiatric: normal insight and affect    Patient Active Problem List:     Hypokalemia     Hyperglycemia     Elevated blood pressure reading without diagnosis of hypertension     Neutrophilic leukocytosis     Acute ST elevation myocardial infarction (STEMI) involving other coronary artery of inferior wall (HCC)        Assessment & Plan:      Inf STEMI  S/p PCI to RCA  HTN    44 y/o man with no cardiac h/o who p/w CP, bilat arm pain, inf STEMI, s/p PCI to RCA.      CAD  - S/p inf STEMI  - S/p PCI to RCA  - On ASA, atorvastatin 80 mg  - On Brilinta 90 mg BID  - Patient has no insurance and Brilinta would be cost prohibitive for patient  - Reviewed with Dr. Rita Whitt - christelle d/c Troyta, give a loading dose of plavix 300 mg and start 75 mg daily tomorrow  - Echo in process  - R wrist drsg CDI, no hematoma, + pulse, tender to touch  - Will add low dose BB - Toprol XL 25 mg QD  - Plan for possible d/c tomorrow  - Keep on tele    HTN  - BP better controlled overnight  - On lisinopril 5 mg QD      Eliot Erazo [address]       I spent a total of 38 minutes in care of the patient and greater than 50% of the time was spent counseling with Windy Osman and coordinating care regarding their diagnosis, treatments and plan of care.

## 2022-10-08 NOTE — H&P
ICU HISTORY AND PHYSICAL   PGY- [facility] Day:   ICU Day:                                                          Code:Full Code  Admit Date: 10/7/2022  PCP: Grace Marsh MD                                  CC: Chest Pain    HISTORY OF PRESENT ILLNESS:   Allyson Gowers is a 46 y/o male with no documented PMHx who presents to the ED complaining of chest pain at home. The patient was sitting at home watching tv when he suddenly experienced chest pain. He experienced pressure with radiation to the neck, dizziness, nausea, diaphoresis and was unable to feel his own pulse. There was no back pain, hemoptysis, SOB, fevers and chills and abdominal pain. He called the ambulance and EMS arrived. Spot EKG showed ST elevations in consecutive leads, so he was treated with ASA and brought to the ED. In the ED, EKG showed ST elevation in the distribution of the RCA and troponin wnl. He received heparin, ticagrelor, and morphine. He then underwent a PCI with stent placement to the RCA. During this procedure, he had an episode of bradycardia requiring atropine. He was brought directly up to the ICU and his only complaint is right shoulder soreness. ED course:  Vitals:   BP (!) 136/93 (10/07/22 2200)    Temp      Pulse 70 (10/07/22 2200)   Resp 17 (10/07/22 2200)    SpO2 98 % (10/07/22 2200)    Labs: troponins wnl; WBC=18.8 and Glucose=144  Imaging: EKG showed ST elevations in inferior leads with reciprocal ST depressions  Interventions: ASA/Heparin/Brillinta and PCI with stent to RCA    PAST HISTORY:   History reviewed. No pertinent past medical history. Past Surgical History:   Procedure Laterality Date    TESTICLE REMOVAL         SocialHistory:   The patient lives by himself in an apartment    Alcohol: <1 drink/week  Illicit drugs: no use  Tobacco: 33 pk year      Family History:  History reviewed. No pertinent family history.   Younger brother stroke 5 years ago    MEDICATIONS:     No current facility-administered medications on file prior to encounter. Current Outpatient Medications on File Prior to Encounter   Medication Sig Dispense Refill    ibuprofen (IBU) 600 MG tablet Take 1 tablet by mouth every 6 hours as needed for Pain. 20 tablet 0         Scheduled Meds:   lisinopril  5 mg Oral Daily    sodium chloride flush  5-40 mL IntraVENous 2 times per day    [START ON 10/8/2022] enoxaparin  40 mg SubCUTAneous Daily    [START ON 10/8/2022] ticagrelor  90 mg Oral Once    [START ON 10/8/2022] ticagrelor  90 mg Oral BID    [START ON 10/8/2022] aspirin  81 mg Oral Daily    atorvastatin  80 mg Oral Nightly    [START ON 10/8/2022] famotidine  20 mg Oral BID      Continuous Infusions:   sodium chloride       PRN Meds:ondansetron **OR** ondansetron, acetaminophen **OR** acetaminophen, magnesium sulfate, potassium chloride **OR** potassium alternative oral replacement **OR** potassium chloride, aluminum & magnesium hydroxide-simethicone, sodium chloride flush, sodium chloride, perflutren lipid microspheres, polyethylene glycol    Allergies: Allergies   Allergen Reactions    Tetanus Toxoids        REVIEW OF SYSTEMS:       History obtained from chart review, the patient, and ED    Review of Systems   Constitutional:  Negative for chills, diaphoresis and fever. Respiratory:  Negative for chest tightness and shortness of breath. Cardiovascular:  Negative for chest pain. Gastrointestinal:  Negative for abdominal pain, constipation, diarrhea and nausea. Genitourinary:  Negative for difficulty urinating. Musculoskeletal:         R shoulder soreness   Neurological:  Negative for dizziness and headaches. PHYSICAL EXAM:       Vitals: BP (!) 134/92   Pulse 85   Resp 17   Ht 5' 10\" (1.778 m)   Wt 200 lb (90.7 kg)   SpO2 99%   BMI 28.70 kg/m²     I/O:  No intake or output data in the 24 hours ending 10/07/22 2110  No intake/output data recorded. No intake/output data recorded.     Physical Examination:     Physical Exam  Constitutional:       Appearance: Normal appearance. HENT:      Head: Normocephalic and atraumatic. Nose: Nose normal.      Mouth/Throat:      Mouth: Mucous membranes are moist.      Comments: Missing top 4 front teeth  Eyes:      Extraocular Movements: Extraocular movements intact. Pupils: Pupils are equal, round, and reactive to light. Cardiovascular:      Rate and Rhythm: Normal rate and regular rhythm. Pulses: Normal pulses. Heart sounds: Normal heart sounds. Pulmonary:      Effort: Pulmonary effort is normal.      Breath sounds: Normal breath sounds. Abdominal:      General: Bowel sounds are normal.      Palpations: Abdomen is soft. Skin:     General: Skin is warm. Capillary Refill: Capillary refill takes less than 2 seconds. Neurological:      General: No focal deficit present. Mental Status: He is alert and oriented to person, place, and time. Mental status is at baseline. Psychiatric:         Mood and Affect: Mood normal.         Behavior: Behavior normal.         Thought Content: Thought content normal.         Access:   -Central Access Day #: None                               -Peripheral Access Day#:1  -Arterial line Day#: None                                   Null Day#: None  NGT Day#: None                                            ETT Day#: None  Vent Settings: None    No results for input(s): PHART, LSF1OED, PO2ART in the last 72 hours. DATA:       Labs:  CBC:   Recent Labs     10/07/22  1932   WBC 18.8*   HGB 15.5   HCT 46.2          BMP:   Recent Labs     10/07/22  1932   *   K 3.4*   CL 99   CO2 23   BUN 14   CREATININE 0.9   GLUCOSE 144*     LFT's: No results for input(s): AST, ALT, ALB, BILITOT, ALKPHOS in the last 72 hours. Troponin:   Recent Labs     10/07/22  1932   TROPONINI <0.01     BNP:No results for input(s): BNP in the last 72 hours. ABGs: No results for input(s): PHART, QRS6DWP, PO2ART in the last 72 hours.   INR: Recent Labs     10/07/22  1932   INR 1.05       U/A:No results for input(s): NITRITE, COLORU, PHUR, LABCAST, WBCUA, RBCUA, MUCUS, TRICHOMONAS, YEAST, BACTERIA, CLARITYU, SPECGRAV, LEUKOCYTESUR, UROBILINOGEN, BILIRUBINUR, BLOODU, GLUCOSEU, AMORPHOUS in the last 72 hours. Invalid input(s): Adela Query    No orders to display       EKG: ST elevation in II/III/aVF and depression in AVR/AVL/V2      ASSESSMENT AND PLAN:   Faustino Nieves is a 45 y.o. male, who presented w RCA STEMI S/P PCI. He has not seen a medical provider in many years    Cardiovascular  Inferior STEMI s/p PCI with stent in RCA  Patient presented with classic left sided chest pain with radiation to arms and neck AND ST-elevations in the inferior leads, corresponding to the predicted blockage observed in the RCA during PCI. Troponins<0.01 indicative that this MI was caught early.   - given ASA by EMA and Heparin + Ticagrelor in the ED  - Per Cardiology Recs  - Ticagrelor 90 mg PO at 00:00, then 90 mg BID starting tomorrow s/p 180 mg loading does at 1915. - Lovenox 40 mg for DVT ppx  - Lipitor 80 mg  - ACE PRN if Hypertensive  - avoid Beta blockers for 24 hours - per Card  - ECHO in AM  - Lipid panel to evaluate for HLD  - Repeat EKG in am     Pulmonary  Chronic Tobacco Use Disorder 40-50 pk yr  -  smoking cessation  - not on O2    Endocrine  Hyperglycemia - Glucose of 144 on BMP (unsure when last meal was before). - A1c to evaluate for diabetes    GI  - PPI for ppx    Code Status:Full Code  FEN: ADULT DIET;  Regular; Low Fat/Low Chol/High Fiber/2 gm Na   PPX:  PPI; Lovenox  DISPO: ICU    This patient has been staffed and discussed with Chacho Brooks MD.   -----------------------------  Leonard Paul, MS4 and Renea Lake MD, PGY-1  10/7/2022  9:10 PM

## 2022-10-08 NOTE — CONSULTS
ICU History & Physical Exam    General Information    Admit Date: 10/7/2022   Hospital Day: 1  ICU Day: 1  Intubation:  Extubation:  Code:Full Code  PCP: Xin Murillo MD      Subjective    Chief Complaint  Chest pain    YAMILETH DELGADILLO is a 44 y/o male with no documented PMHx who presents to the ED complaining of chest pain at home. The patient was sitting at home watching tv when he suddenly experienced chest pain. He experienced pressure with radiation to the neck, dizziness, nausea, diaphoresis and was unable to feel his own pulse. There was no back pain, hemoptysis, SOB, fevers and chills and abdominal pain. He called the ambulance and EMS arrived. Spot EKG showed ST elevations in consecutive leads, so he was treated with ASA and brought to the ED. In the ED, EKG showed ST elevation in the distribution of the RCA and troponin wnl. He received heparin, ticagrelor, and morphine. He then underwent a PCI with stent placement to the RCA. During this procedure, he had an episode of bradycardia requiring atropine. He was brought directly up to the ICU and his only complaint is right shoulder soreness. Interval history:  Patient is seen on the bedside. Patient complaining of left anterior chest pain. Acute in onset forward and, sharp, nonradiating, exacerbates with deep breath. He denies any nausea or vomiting. Or any diaphoresis    Review of System  Review of Systems   Constitutional:  Positive for fatigue. Negative for activity change and appetite change. HENT:  Negative for congestion. Eyes:  Negative for photophobia and discharge. Respiratory:  Positive for shortness of breath. Negative for apnea. Cardiovascular:  Positive for chest pain. Negative for palpitations and leg swelling. Gastrointestinal:  Negative for abdominal distention. Endocrine: Negative for cold intolerance. Genitourinary: Negative. Musculoskeletal: Negative. Skin: Negative. Neurological:  Negative for dizziness. Psychiatric/Behavioral: Negative. Past History  History reviewed. No pertinent past medical history. Procedure Laterality Date    TESTICLE REMOVAL         SocialHistory:   TOBACCO:   reports that he has been smoking cigarettes. He has been smoking an average of 1.5 packs per day. He has never used smokeless tobacco.  ETOH:   reports that he does not currently use alcohol. DRUGS : No use  Patient currently lives alone    Family History: Younger brother had stroke 5 years ago    Objective    Vitals:     Patient Vitals for the past 4 hrs:   BP Pulse Resp SpO2 Height Weight   10/07/22 2045 (!) 134/92 85 17 99 % -- --   10/07/22 2030 126/87 90 20 95 % -- --   10/07/22 1920 (!) 139/102 71 15 94 % -- --   10/07/22 1858 (!) 151/99 56 16 97 % 5' 10\" (1.778 m) 200 lb (90.7 kg)       No intake or output data in the 24 hours ending 10/07/22 2144    Physical Exam  Constitutional:       Appearance: Normal appearance. HENT:      Head: Normocephalic and atraumatic. Mouth/Throat:      Mouth: Mucous membranes are moist.   Eyes:      Extraocular Movements: Extraocular movements intact. Pupils: Pupils are equal, round, and reactive to light. Cardiovascular:      Rate and Rhythm: Normal rate and regular rhythm. Pulses: Normal pulses. Heart sounds: Normal heart sounds. Pulmonary:      Effort: Pulmonary effort is normal.      Breath sounds: Normal breath sounds. Abdominal:      General: Abdomen is flat. Palpations: Abdomen is soft. Musculoskeletal:         General: No swelling or tenderness. Cervical back: Normal range of motion and neck supple. Skin:     Coloration: Skin is not jaundiced or pale. Neurological:      General: No focal deficit present. Mental Status: He is alert and oriented to person, place, and time. Cranial Nerves: No cranial nerve deficit. Sensory: No sensory deficit.    Psychiatric:         Mood and Affect: Mood normal.         Behavior: Behavior normal.       Wt Readings from Last 3 Encounters:   10/07/22 200 lb (90.7 kg)     Lab Values:    CBC:  Recent Labs     10/07/22  1932   WBC 18.8*   HGB 15.5   HCT 46.2      MCV 95.6     BMP:   Recent Labs     10/07/22  1932   *   K 3.4*   CL 99   CO2 23   BUN 14   CREATININE 0.9   GLUCOSE 144*   CALCIUM 9.0   MG 2.00   ANIONGAP 13     Lactic Acid:    Troponin:   Recent Labs     10/07/22  1932   Eldonna Heater <0.01     INR:   Recent Labs     10/07/22  1932   INR 1.05     Radiology  10/08/2022  CXR: No evidence of acute cardiopulmonary disease    Cardiology  10/08/2022  EKG: Significantly improved from the yesterday. Unremarkable right now  ECHO: EF: 50-55%, Normal right and left VF    Medications:  Continuous Infusions:   sodium chloride       Scheduled Meds:   lisinopril  5 mg Oral Daily    sodium chloride flush  5-40 mL IntraVENous 2 times per day    [START ON 10/8/2022] enoxaparin  40 mg SubCUTAneous Daily    [START ON 10/8/2022] ticagrelor  90 mg Oral Once    [START ON 10/8/2022] ticagrelor  90 mg Oral BID    [START ON 10/8/2022] aspirin  81 mg Oral Daily    atorvastatin  80 mg Oral Nightly    [START ON 10/8/2022] famotidine  20 mg Oral BID        Assessment/Plan:  Ratna Sood is a 45 y.o. male, who presented w RCA STEMI S/P PCI.  He has not seen a medical provider in many years     Cardiovascular  RCA STEMI s/p PCI   - given ASA by EMA and Heparin + Ticagrelor in the ED  - Per Cardiology Recs  - Next Ticagrelor dose at 00:00, then resume nl schedule in AM  - Lovenox  - Lipitor 80 mg  - ACE PRN if Hypertensive  - avoid Beta blockers for 24 hours -due to bradycardia  - ECHO in AM  - Lipid panel to evaluate for HLD     Pulmonary  Chronic tobacco use; x pk yr  -  smoking cessation  - not on O2     Endocrine  - A1c to evaluate for diabetes     GI     - PPI ppx    PPX: Lovenox  Diet: Regular  DISPO: ICU  ELOS: 1 day  Barrier to Discharge: Echo Stoney Burkitt, MD, PGY-1  10/07/22  9:44 PM    This patient has been staffed and discussed with Dr. Khadijah Lynch. .Pulmonary & Critical Care    Patient seen and examined. I agree with Dr. Can Chamberlain history, physical, lab findings, assessment and plan. Had CP earlier. Resolved with SL NTG. No EKG changes. Still needs hospital observation ut can down grade to Tele.     Bulk of mgmt per Cardiology  Stressed need to stop smoking    Satya Watson MD

## 2022-10-08 NOTE — PROGRESS NOTES
4 Eyes Admission Assessment     I agree as the admission nurse that 2 RN's have performed a thorough Head to Toe Skin Assessment on the patient. ALL assessment sites listed below have been assessed on admission. Areas assessed by both nurses:   [x]   Head, Face, and Ears   [x]   Shoulders, Back, and Chest  [x]   Arms, Elbows, and Hands      Coccyx, Sacrum, and Ischium  [x][x]   Legs, Feet, and Heels        Does the Patient have Skin Breakdown?   No         Nishant Prevention initiated:  NA   Wound Care Orders initiated:  NA      WOC nurse consulted for Pressure Injury (Stage 3,4, Unstageable, DTI, NWPT, and Complex wounds) or Nishant score 18 or lower:  NA      Nurse 1 eSignature: Electronically signed by Alesia Fleischer, RN on 10/7/22 at 9:49 PM EDT    **SHARE this note so that the co-signing nurse is able to place an eSignature**    Nurse 2 eSignature: {Esignature:[id number]}

## 2022-10-08 NOTE — PROGRESS NOTES
Hospitalist Progress Note      Name:  Huma Gage /Age/Sex: 1970  (45 y.o. male)   MRN & CSN:  [id number] & [id number] Admission Date/Time: 10/7/2022  6:55 PM   Location:  [facility]/[id number] PCP: Carmel Cleveland MD         Hospital Day: 2    Assessment and Plan:       45 y.o. male, who presented w RCA STEMI S/P PCI. He has not seen a medical provider in many years     Cardiovascular  Inferior STEMI s/p PCI with stent in RCA  Patient presented with classic left sided chest pain with radiation to arms and neck AND ST-elevations in the inferior leads, corresponding to the predicted blockage observed in the RCA during PCI. Troponins<0.01 indicative that this MI was caught early.   - given ASA by EMA and Heparin + Ticagrelor in the ED  - Per Cardiology Recs  - Ticagrelor 90 mg PO at 00:00, then 90 mg BID starting today  s/p 180 mg loading does at 1915. - Lovenox 40 mg for DVT ppx  - Lipitor 80 mg  - ACE PRN if Hypertensive  - avoid Beta blockers for 24 hours - per Card  - ECHO in AM  - Lipid panel to evaluate for HLD  - Repeat EKG in am   Reports CP this morning   Given one dose of iv morphine   Leukocytosis , could be reactive ? Denies urinary symptoms , no cough   No change on BM  Monitor  CBC       Pulmonary  Chronic Tobacco Use Disorder 40-50 pk yr  -  smoking cessation  - not on O2  CXR non acute      Endocrine  Hyperglycemia - Glucose of 144 on BMP (unsure when last meal was before). - A1c to evaluate for diabetes     GI  - PPI for ppx     Code Status:Full Code    Diet ADULT DIET;  Regular; Low Fat/Low Chol/High Fiber/2 gm Na   DVT Prophylaxis [x] Lovenox, []  Heparin, [] SCDs, [] Ambulation   GI Prophylaxis [] PPI,  [] H2 Blocker,  [] Carafate,  [] Diet/Tube Feeds   Code Status Full Code   Disposition Patient requires continued admission due to    MDM [] Low, [] Moderate,[]  High  Patient's risk as above due to      History of Present Illness:     Patient was seen and examined at bedside  Developed chest pain this morning  Cardiology on board  Still in the ICU  Has leukocytosis , could be reactive ? Objective: Intake/Output Summary (Last 24 hours) at 10/8/2022 1105  Last data filed at 10/8/2022 1035  Gross per 24 hour   Intake 590 ml   Output 0 ml   Net 590 ml      Vitals:   Vitals:    10/08/22 1000   BP: 118/81   Pulse: 82   Resp: 21   Temp:    SpO2: 98%     Physical Exam:   GEN Awake.  Alert , not in respiratory distress, not in pain  HEENT: PEERLA, , supple neck,   Chest: air entry equal bilaterally, no wheezing or crepitation  Heart: S1 and S2 heard, no murmur, no gallop or rub, regular rate  Abdomen: soft, ND , Nt, +BS  Extremities: no cyanosis, tenderness or erythema, peripheral pulses audible  Neurology: alert, oriented x3, able to move 4 limbs    Medications:   Medications:    GI cocktail   Oral Once    lisinopril  5 mg Oral Daily    sodium chloride flush  5-40 mL IntraVENous 2 times per day    enoxaparin  40 mg SubCUTAneous Daily    ticagrelor  90 mg Oral BID    aspirin  81 mg Oral Daily    atorvastatin  80 mg Oral Nightly    famotidine  20 mg Oral BID      Infusions:    sodium chloride       PRN Meds: Benzocaine-Menthol, 1 lozenge, Q2H PRN  ondansetron, 4 mg, Q8H PRN   Or  ondansetron, 4 mg, Q6H PRN  acetaminophen, 650 mg, Q6H PRN   Or  acetaminophen, 650 mg, Q6H PRN  magnesium sulfate, 2,000 mg, PRN  potassium chloride, 40 mEq, PRN   Or  potassium alternative oral replacement, 40 mEq, PRN   Or  potassium chloride, 10 mEq, PRN  aluminum & magnesium hydroxide-simethicone, 30 mL, Q6H PRN  sodium chloride flush, 5-40 mL, PRN  sodium chloride, , PRN  perflutren lipid microspheres, 1.5 mL, ONCE PRN  polyethylene glycol, 17 g, Daily PRN          Electronically signed by Mayra Rivero MD on 10/8/2022 at 11:05 AM

## 2022-10-09 VITALS
TEMPERATURE: 97.9 F | OXYGEN SATURATION: 97 % | WEIGHT: 200 LBS | HEIGHT: 70 IN | BODY MASS INDEX: 28.63 KG/M2 | HEART RATE: 83 BPM | RESPIRATION RATE: 13 BRPM | DIASTOLIC BLOOD PRESSURE: 78 MMHG | SYSTOLIC BLOOD PRESSURE: 108 MMHG

## 2022-10-09 LAB
A/G RATIO: 1.5 (ref 1.1–2.2)
ALBUMIN SERPL-MCNC: 4.2 G/DL (ref 3.4–5)
ALP BLD-CCNC: 103 U/L (ref 40–129)
ALT SERPL-CCNC: 35 U/L (ref 10–40)
ANION GAP SERPL CALCULATED.3IONS-SCNC: 10 MMOL/L (ref 3–16)
AST SERPL-CCNC: 93 U/L (ref 15–37)
BASOPHILS ABSOLUTE: 0 K/UL (ref 0–0.2)
BASOPHILS RELATIVE PERCENT: 0 %
BILIRUB SERPL-MCNC: 1 MG/DL (ref 0–1)
BUN BLDV-MCNC: 10 MG/DL (ref 7–20)
CALCIUM SERPL-MCNC: 9.3 MG/DL (ref 8.3–10.6)
CHLORIDE BLD-SCNC: 104 MMOL/L (ref 99–110)
CO2: 24 MMOL/L (ref 21–32)
CREAT SERPL-MCNC: 0.8 MG/DL (ref 0.9–1.3)
EOSINOPHILS ABSOLUTE: 0 K/UL (ref 0–0.6)
EOSINOPHILS RELATIVE PERCENT: 0 %
GFR AFRICAN AMERICAN: >60
GFR NON-AFRICAN AMERICAN: >60
GLUCOSE BLD-MCNC: 107 MG/DL (ref 70–99)
HCT VFR BLD CALC: 44.7 % (ref 40.5–52.5)
HEMOGLOBIN: 15.2 G/DL (ref 13.5–17.5)
LYMPHOCYTES ABSOLUTE: 1.7 K/UL (ref 1–5.1)
LYMPHOCYTES RELATIVE PERCENT: 10 %
MAGNESIUM: 1.8 MG/DL (ref 1.8–2.4)
MCH RBC QN AUTO: 32.2 PG (ref 26–34)
MCHC RBC AUTO-ENTMCNC: 34 G/DL (ref 31–36)
MCV RBC AUTO: 94.6 FL (ref 80–100)
MONOCYTES ABSOLUTE: 0.5 K/UL (ref 0–1.3)
MONOCYTES RELATIVE PERCENT: 3 %
MYELOCYTE PERCENT: 5 %
NEUTROPHILS ABSOLUTE: 15 K/UL (ref 1.7–7.7)
NEUTROPHILS RELATIVE PERCENT: 82 %
PDW BLD-RTO: 13.1 % (ref 12.4–15.4)
PLATELET # BLD: 299 K/UL (ref 135–450)
PLATELET SLIDE REVIEW: ADEQUATE
PMV BLD AUTO: 8.8 FL (ref 5–10.5)
POTASSIUM SERPL-SCNC: 4.1 MMOL/L (ref 3.5–5.1)
RBC # BLD: 4.72 M/UL (ref 4.2–5.9)
RBC # BLD: NORMAL 10*6/UL
SLIDE REVIEW: ABNORMAL
SODIUM BLD-SCNC: 138 MMOL/L (ref 136–145)
TOTAL PROTEIN: 7 G/DL (ref 6.4–8.2)
WBC # BLD: 17.2 K/UL (ref 4–11)

## 2022-10-09 PROCEDURE — 6370000000 HC RX 637 (ALT 250 FOR IP): Performed by: INTERNAL MEDICINE

## 2022-10-09 PROCEDURE — 36415 COLL VENOUS BLD VENIPUNCTURE: CPT

## 2022-10-09 PROCEDURE — 94761 N-INVAS EAR/PLS OXIMETRY MLT: CPT

## 2022-10-09 PROCEDURE — 6360000002 HC RX W HCPCS

## 2022-10-09 PROCEDURE — 6370000000 HC RX 637 (ALT 250 FOR IP): Performed by: STUDENT IN AN ORGANIZED HEALTH CARE EDUCATION/TRAINING PROGRAM

## 2022-10-09 PROCEDURE — 6370000000 HC RX 637 (ALT 250 FOR IP)

## 2022-10-09 PROCEDURE — 80053 COMPREHEN METABOLIC PANEL: CPT

## 2022-10-09 PROCEDURE — 83735 ASSAY OF MAGNESIUM: CPT

## 2022-10-09 PROCEDURE — 85025 COMPLETE CBC W/AUTO DIFF WBC: CPT

## 2022-10-09 PROCEDURE — 6370000000 HC RX 637 (ALT 250 FOR IP): Performed by: NURSE PRACTITIONER

## 2022-10-09 PROCEDURE — 2580000003 HC RX 258

## 2022-10-09 RX ORDER — NITROGLYCERIN 0.4 MG/1
0.4 TABLET SUBLINGUAL EVERY 5 MIN PRN
Qty: 25 TABLET | Refills: 3 | Status: SHIPPED | OUTPATIENT
Start: 2022-10-09

## 2022-10-09 RX ORDER — NITROGLYCERIN 0.4 MG/1
0.4 TABLET SUBLINGUAL EVERY 5 MIN PRN
Status: DISCONTINUED | OUTPATIENT
Start: 2022-10-09 | End: 2022-10-09 | Stop reason: HOSPADM

## 2022-10-09 RX ADMIN — ASPIRIN 81 MG 81 MG: 81 TABLET ORAL at 09:18

## 2022-10-09 RX ADMIN — LISINOPRIL 5 MG: 10 TABLET ORAL at 09:18

## 2022-10-09 RX ADMIN — ENOXAPARIN SODIUM 40 MG: 100 INJECTION SUBCUTANEOUS at 09:18

## 2022-10-09 RX ADMIN — ACETAMINOPHEN 650 MG: 325 TABLET ORAL at 09:25

## 2022-10-09 RX ADMIN — FAMOTIDINE 20 MG: 20 TABLET ORAL at 09:18

## 2022-10-09 RX ADMIN — METOPROLOL SUCCINATE 25 MG: 25 TABLET, FILM COATED, EXTENDED RELEASE ORAL at 09:18

## 2022-10-09 RX ADMIN — SODIUM CHLORIDE, PRESERVATIVE FREE 10 ML: 5 INJECTION INTRAVENOUS at 08:43

## 2022-10-09 RX ADMIN — NITROGLYCERIN 0.4 MG: 0.4 TABLET, ORALLY DISINTEGRATING SUBLINGUAL at 11:38

## 2022-10-09 RX ADMIN — CLOPIDOGREL BISULFATE 75 MG: 75 TABLET ORAL at 09:18

## 2022-10-09 ASSESSMENT — PAIN SCALES - GENERAL
PAINLEVEL_OUTOF10: 4
PAINLEVEL_OUTOF10: 0
PAINLEVEL_OUTOF10: 4
PAINLEVEL_OUTOF10: 4
PAINLEVEL_OUTOF10: 0
PAINLEVEL_OUTOF10: 4
PAINLEVEL_OUTOF10: 0

## 2022-10-09 ASSESSMENT — PAIN DESCRIPTION - LOCATION
LOCATION: CHEST

## 2022-10-09 ASSESSMENT — PAIN DESCRIPTION - ORIENTATION
ORIENTATION: MID

## 2022-10-09 ASSESSMENT — PAIN DESCRIPTION - DESCRIPTORS
DESCRIPTORS: ACHING
DESCRIPTORS: ACHING;SHARP
DESCRIPTORS: SHARP
DESCRIPTORS: ACHING

## 2022-10-09 ASSESSMENT — PAIN DESCRIPTION - PAIN TYPE: TYPE: ACUTE PAIN

## 2022-10-09 ASSESSMENT — PAIN - FUNCTIONAL ASSESSMENT: PAIN_FUNCTIONAL_ASSESSMENT: ACTIVITIES ARE NOT PREVENTED

## 2022-10-09 ASSESSMENT — PAIN DESCRIPTION - FREQUENCY: FREQUENCY: INTERMITTENT

## 2022-10-09 NOTE — PROGRESS NOTES
Given and explained discharge instructions and medications with verbal understanding. Case management saw and patient provided assistance with prescriptions. PIV removed x2 and dressings applied.

## 2022-10-09 NOTE — PLAN OF CARE
Problem: Discharge Planning  Goal: Discharge to home or other facility with appropriate resources  Flowsheets (Taken 10/9/2022 0417)  Discharge to home or other facility with appropriate resources:   Arrange for needed discharge resources and transportation as appropriate   Identify barriers to discharge with patient and caregiver   Identify discharge learning needs (meds, wound care, etc)     Problem: Pain  Goal: Verbalizes/displays adequate comfort level or baseline comfort level  Flowsheets (Taken 10/9/2022 0417)  Verbalizes/displays adequate comfort level or baseline comfort level:   Encourage patient to monitor pain and request assistance   Assess pain using appropriate pain scale   Administer analgesics based on type and severity of pain and evaluate response   Implement non-pharmacological measures as appropriate and evaluate response

## 2022-10-09 NOTE — PROGRESS NOTES
Cardiology - PROGRESS NOTE    Admit Date: 10/7/2022     Chief Complaint: inf STEMI     Interval History:   Patient seen and examined and notes reviewed. Patient is being followed for in STEMI. Patient had felt CP and bilat upper extremity pain. States he had an emesis and knew something was wrong. Came to the ED and found to have an inf STEMI. S/p LHC with PCI to RCA. R wrist soft, no hematoma, good pulse. Does c/o R wrist tenderness, some chest discomfort and fatigue. Placed on Brilinta post procedure. Patient does work but he has no insurance. States if Rx is too expensive then he would not get filled. Echo showed an EF 50-55%. Patient with CP last pm. He states it happens when he takes a deep breath. He feels it in his throat, and then it radiates down the mid epigastric region and a little to the left side. He states he then has to cough. He notes it with exertion and  with using the IS. It does not feel anything like the angina he had when he came in. Trop was 3.88. CXR this am showed no acute process. Patient is a smoker.      In: 700 [P.O.:700]  Out: 0    Wt Readings from Last 2 Encounters:   10/07/22 200 lb (90.7 kg)       Data:   Scheduled Meds:   Scheduled Meds:   clopidogrel  75 mg Oral Daily    metoprolol succinate  25 mg Oral Daily    lisinopril  5 mg Oral Daily    sodium chloride flush  5-40 mL IntraVENous 2 times per day    enoxaparin  40 mg SubCUTAneous Daily    aspirin  81 mg Oral Daily    atorvastatin  80 mg Oral Nightly    famotidine  20 mg Oral BID     Continuous Infusions:   sodium chloride       PRN Meds:.Benzocaine-Menthol, ondansetron **OR** ondansetron, acetaminophen **OR** acetaminophen, magnesium sulfate, potassium chloride **OR** potassium alternative oral replacement **OR** potassium chloride, aluminum & magnesium hydroxide-simethicone, sodium chloride flush, sodium chloride, perflutren lipid microspheres, polyethylene

## 2022-10-09 NOTE — DISCHARGE SUMMARY
Discharge Summary    Name:  Yaa Armenta /Age/Sex: 1970  (45 y.o. male)   MRN & CSN:  [id number] & [id number] Admission Date/Time: 10/7/2022  6:55 PM   Attending:  Jessica Reilly MD Discharging Physician: Jessica Reilly MD     Hospital Course:   Yaa Armenta is a 45 y.o.  male  who presents with Acute ST elevation myocardial infarction (STEMI) involving other coronary artery of inferior wall ([facility] [address]    45years old male presented with this chest pain as well as found to have STEMI status post left heart cath and PCI (stent and RCA), patient presented with the classical left-sided chest pain with radiation to arms on neck with ST elevation in the inferior leads. Continue on aspirin, statin  and Plavix  Continue on lisinopril on beta-blocker as per cardiology recommendation  Is a cleared by the cardiology to be discharged home  Leukocytosis most likely reactive on its trending down  Patient is a chronic tobacco abuse, told regarding smoking cessation  Chest X-ray nonacute      The patient expressed appropriate understanding of and agreement with the discharge recommendations, medications, and plan.      Consults this admission:  IP CONSULT TO HOSPITALIST  IP CONSULT TO CARDIOLOGY  IP CONSULT TO CARDIOLOGY  IP CONSULT TO CRITICAL CARE    Discharge Instruction:   Follow up appointments: follow up with cardiologist as scheduled   Primary care physician:  within 2 weeks    Diet:  cardiac diet   Activity: activity as tolerated  Disposition: Discharged to:   [x]Home, [][facility], []SNF, []Acute Rehab, []Hospice   Condition on discharge: Stable    Discharge Medications:        Medication List        START taking these medications      aspirin 81 MG chewable tablet  Take 1 tablet by mouth daily     atorvastatin 80 MG tablet  Commonly known as: LIPITOR  Take 1 tablet by mouth nightly     clopidogrel 75 MG tablet  Commonly known as: PLAVIX  Take 1 tablet by mouth daily     lisinopril 5 MG tablet  Commonly known as: PRINIVIL;ZESTRIL  Take 1 tablet by mouth daily     metoprolol succinate 25 MG extended release tablet  Commonly known as: TOPROL XL  Take 1 tablet by mouth daily            STOP taking these medications      ibuprofen 600 MG tablet  Commonly known as: IBU               Where to Get Your Medications        These medications were sent to [address], [address] [facility] Maira Almanza [phone]  [address] [facility], [address] [place] [address]      Phone: [phone]   aspirin 81 MG chewable tablet  atorvastatin 80 MG tablet  clopidogrel 75 MG tablet  lisinopril 5 MG tablet  metoprolol succinate 25 MG extended release tablet         Objective Findings at Discharge:   /75   Pulse 70   Temp 99.3 °F (37.4 °C) (Oral)   Resp 27   Ht 5' 10\" (1.778 m)   Wt 200 lb (90.7 kg)   SpO2 97%   BMI 28.70 kg/m²            PHYSICAL EXAM   GEN    Awake.  Alert , not in respiratory distress, not in pain  HEENT: PEERLA, , supple neck,   Chest: air entry equal bilaterally, no wheezing or crepitation  Heart: S1 and S2 heard, no murmur, no gallop or rub, regular rate  Abdomen: soft, ND , Nt, +BS  Extremities: no cyanosis, tenderness or erythema, peripheral pulses audible  Neurology: alert, oriented x3, able to move 4 limbs    BMP/CBC  Recent Labs     10/07/22  1932 10/08/22  0416 10/08/22  0421 10/09/22  0614   * 136  --  138   K 3.4* 4.1  --  4.1   CL 99 102  --  104   CO2 23 22  --  24   BUN 14 12  --  10   CREATININE 0.9 0.8*  --  0.8*   WBC 18.8*  --  18.2* 17.2*   HCT 46.2  --  43.6 44.7     --  287 299       IMAGING:      Discharge Time of 35 minutes    Electronically signed by Gaurav Burnett MD on 10/9/2022 at 11:06 AM

## 2022-10-10 LAB
EKG ATRIAL RATE: 81 BPM
EKG DIAGNOSIS: NORMAL
EKG P AXIS: 57 DEGREES
EKG P-R INTERVAL: 198 MS
EKG Q-T INTERVAL: 340 MS
EKG QRS DURATION: 80 MS
EKG QTC CALCULATION (BAZETT): 394 MS
EKG R AXIS: 37 DEGREES
EKG T AXIS: 36 DEGREES
EKG VENTRICULAR RATE: 81 BPM
POC ACT LR: >400 SEC

## 2022-10-10 PROCEDURE — 93010 ELECTROCARDIOGRAM REPORT: CPT | Performed by: INTERNAL MEDICINE

## 2022-10-19 ENCOUNTER — OFFICE VISIT (OUTPATIENT)
Dept: CARDIOLOGY CLINIC | Age: 52
End: 2022-10-19

## 2022-10-19 VITALS
HEART RATE: 88 BPM | SYSTOLIC BLOOD PRESSURE: 120 MMHG | HEIGHT: 69 IN | DIASTOLIC BLOOD PRESSURE: 70 MMHG | BODY MASS INDEX: 29.86 KG/M2 | OXYGEN SATURATION: 96 % | WEIGHT: 201.6 LBS

## 2022-10-19 DIAGNOSIS — I21.19 ACUTE ST ELEVATION MYOCARDIAL INFARCTION (STEMI) INVOLVING OTHER CORONARY ARTERY OF INFERIOR WALL (HCC): Primary | ICD-10-CM

## 2022-10-19 PROCEDURE — 99214 OFFICE O/P EST MOD 30 MIN: CPT | Performed by: NURSE PRACTITIONER

## 2022-10-19 RX ORDER — AMOXICILLIN AND CLAVULANATE POTASSIUM 875; 125 MG/1; MG/1
1 TABLET, FILM COATED ORAL 2 TIMES DAILY
Qty: 20 TABLET | Refills: 0 | Status: SHIPPED | OUTPATIENT
Start: 2022-10-19 | End: 2022-10-29

## 2022-11-10 DIAGNOSIS — I21.19 ACUTE ST ELEVATION MYOCARDIAL INFARCTION (STEMI) INVOLVING OTHER CORONARY ARTERY OF INFERIOR WALL (HCC): ICD-10-CM

## 2022-11-10 LAB
ALBUMIN SERPL-MCNC: 4.3 G/DL (ref 3.4–5)
ALP BLD-CCNC: 148 U/L (ref 40–129)
ALT SERPL-CCNC: 20 U/L (ref 10–40)
AST SERPL-CCNC: 18 U/L (ref 15–37)
BILIRUB SERPL-MCNC: 0.4 MG/DL (ref 0–1)
BILIRUBIN DIRECT: <0.2 MG/DL (ref 0–0.3)
BILIRUBIN, INDIRECT: ABNORMAL MG/DL (ref 0–1)
CHOLESTEROL, TOTAL: 95 MG/DL (ref 0–199)
HCT VFR BLD CALC: 43.9 % (ref 40.5–52.5)
HDLC SERPL-MCNC: 31 MG/DL (ref 40–60)
HEMOGLOBIN: 15.1 G/DL (ref 13.5–17.5)
LDL CHOLESTEROL CALCULATED: 51 MG/DL
MAGNESIUM: 2.1 MG/DL (ref 1.8–2.4)
MCH RBC QN AUTO: 32.4 PG (ref 26–34)
MCHC RBC AUTO-ENTMCNC: 34.3 G/DL (ref 31–36)
MCV RBC AUTO: 94.4 FL (ref 80–100)
PDW BLD-RTO: 13.1 % (ref 12.4–15.4)
PLATELET # BLD: 325 K/UL (ref 135–450)
PMV BLD AUTO: 8.4 FL (ref 5–10.5)
RBC # BLD: 4.65 M/UL (ref 4.2–5.9)
TOTAL PROTEIN: 6.7 G/DL (ref 6.4–8.2)
TRIGL SERPL-MCNC: 63 MG/DL (ref 0–150)
TSH REFLEX FT4: 1.2 UIU/ML (ref 0.27–4.2)
VITAMIN D 25-HYDROXY: 26.7 NG/ML
VLDLC SERPL CALC-MCNC: 13 MG/DL
WBC # BLD: 13 K/UL (ref 4–11)

## 2022-11-17 ENCOUNTER — OFFICE VISIT (OUTPATIENT)
Dept: CARDIOLOGY CLINIC | Age: 52
End: 2022-11-17

## 2022-11-17 VITALS
DIASTOLIC BLOOD PRESSURE: 72 MMHG | BODY MASS INDEX: 28.21 KG/M2 | SYSTOLIC BLOOD PRESSURE: 128 MMHG | WEIGHT: 191 LBS | HEART RATE: 70 BPM

## 2022-11-17 DIAGNOSIS — I25.10 CORONARY ARTERY DISEASE INVOLVING NATIVE CORONARY ARTERY OF NATIVE HEART WITHOUT ANGINA PECTORIS: Primary | ICD-10-CM

## 2022-11-17 DIAGNOSIS — I10 HTN (HYPERTENSION), BENIGN: ICD-10-CM

## 2022-11-17 DIAGNOSIS — E78.5 HYPERLIPIDEMIA, UNSPECIFIED HYPERLIPIDEMIA TYPE: ICD-10-CM

## 2022-11-17 PROCEDURE — 99214 OFFICE O/P EST MOD 30 MIN: CPT | Performed by: INTERNAL MEDICINE

## 2022-11-17 PROCEDURE — 3074F SYST BP LT 130 MM HG: CPT | Performed by: INTERNAL MEDICINE

## 2022-11-17 PROCEDURE — 3078F DIAST BP <80 MM HG: CPT | Performed by: INTERNAL MEDICINE

## 2022-11-17 RX ORDER — ASPIRIN 81 MG/1
81 TABLET, CHEWABLE ORAL DAILY
Qty: 9 TABLET | Refills: 3 | Status: SHIPPED | OUTPATIENT
Start: 2022-11-17

## 2022-11-17 RX ORDER — CLOPIDOGREL BISULFATE 75 MG/1
75 TABLET ORAL DAILY
Qty: 90 TABLET | Refills: 3 | Status: SHIPPED | OUTPATIENT
Start: 2022-11-17

## 2022-11-17 RX ORDER — LISINOPRIL 5 MG/1
5 TABLET ORAL DAILY
Qty: 90 TABLET | Refills: 3 | Status: SHIPPED | OUTPATIENT
Start: 2022-11-17

## 2022-11-17 RX ORDER — ATORVASTATIN CALCIUM 80 MG/1
80 TABLET, FILM COATED ORAL NIGHTLY
Qty: 90 TABLET | Refills: 3 | Status: SHIPPED | OUTPATIENT
Start: 2022-11-17

## 2022-11-17 RX ORDER — METOPROLOL SUCCINATE 25 MG/1
25 TABLET, EXTENDED RELEASE ORAL DAILY
Qty: 90 TABLET | Refills: 3 | Status: SHIPPED | OUTPATIENT
Start: 2022-11-17

## 2023-02-06 RX ORDER — ASPIRIN 81 MG/1
81 TABLET, CHEWABLE ORAL DAILY
Qty: 90 TABLET | Refills: 3 | Status: SHIPPED | OUTPATIENT
Start: 2023-02-06

## 2023-02-06 NOTE — TELEPHONE ENCOUNTER
Requested Prescriptions     Pending Prescriptions Disp Refills    aspirin 81 MG chewable tablet 90 tablet 3     Sig: Take 1 tablet by mouth daily              Last Office Visit: 11/17/2022     Next Office Visit: 3/24/2023

## 2023-06-09 ENCOUNTER — OFFICE VISIT (OUTPATIENT)
Dept: CARDIOLOGY CLINIC | Age: 53
End: 2023-06-09
Payer: COMMERCIAL

## 2023-06-09 VITALS
WEIGHT: 187 LBS | BODY MASS INDEX: 27.62 KG/M2 | DIASTOLIC BLOOD PRESSURE: 70 MMHG | HEART RATE: 76 BPM | SYSTOLIC BLOOD PRESSURE: 118 MMHG

## 2023-06-09 DIAGNOSIS — I25.10 CORONARY ARTERY DISEASE INVOLVING NATIVE CORONARY ARTERY OF NATIVE HEART WITHOUT ANGINA PECTORIS: Primary | ICD-10-CM

## 2023-06-09 PROCEDURE — 99214 OFFICE O/P EST MOD 30 MIN: CPT | Performed by: INTERNAL MEDICINE

## 2023-06-09 NOTE — PROGRESS NOTES
[address] Daily Progress Note        CC: s/p RCA stent for stemi October 2022. Subjective:  Mr. Edie Edmond feels good and is back to work. No chest pains doing better. Objective:   /70   Pulse 76   Wt 187 lb (84.8 kg)   BMI 27.62 kg/m²   No intake or output data in the 24 hours ending 06/09/23 1431    TELEMETRY: Sinus    Physical Exam:  General:  Awake, alert, NAD  Eyes:  EOMI PERRL anicteric  Skin:  Warm and dry. Neck:  JVP normal  Chest:  Diminshed. No Rales. Cardiovascular:  RRR, Normal S1S2. No Murmur. No Rub. No Gallops. Abdomen:  Soft NT  + bs  Extremities: No edema  Neuro:  CN II-XII intact. No focal deficits. No weakness. No lateralizing findings. Psych:  Normal thought and affect. MSK:  No Cyanosis nor Clubbing. Symmetrical strength upper and lower extremities      Assessment:  Patient Active Problem List    Diagnosis Date Noted    Tobacco use 10/08/2022    Hypokalemia 10/07/2022    Hyperglycemia 10/07/2022    Elevated blood pressure reading without diagnosis of hypertension 76/65/7247    Neutrophilic leukocytosis 45/91/2435    Acute ST elevation myocardial infarction (STEMI) involving other coronary artery of inferior wall ([address] 10/07/2022       Plan:  CAD:  s/p RCA stent. HLP stable on lipitor  HTN controllled. Renewed meds. Continue current medications. Stable cardiovascular status.       Core Measures:  Discharge instructions:   LVEF documented:   ACEI for LV dysfunction:   Smoking Cessation:    Levon Root MD, MD 6/9/2023 2:31 PM

## 2024-07-10 ENCOUNTER — OFFICE VISIT (OUTPATIENT)
Dept: CARDIOLOGY CLINIC | Age: 54
End: 2024-07-10
Payer: COMMERCIAL

## 2024-07-10 VITALS
WEIGHT: 187.6 LBS | SYSTOLIC BLOOD PRESSURE: 118 MMHG | HEART RATE: 70 BPM | DIASTOLIC BLOOD PRESSURE: 64 MMHG | BODY MASS INDEX: 27.7 KG/M2

## 2024-07-10 DIAGNOSIS — I25.10 CORONARY ARTERY DISEASE INVOLVING NATIVE CORONARY ARTERY OF NATIVE HEART WITHOUT ANGINA PECTORIS: ICD-10-CM

## 2024-07-10 DIAGNOSIS — E78.5 HYPERLIPIDEMIA, UNSPECIFIED HYPERLIPIDEMIA TYPE: ICD-10-CM

## 2024-07-10 DIAGNOSIS — I10 HTN (HYPERTENSION), BENIGN: Primary | ICD-10-CM

## 2024-07-10 PROCEDURE — 93000 ELECTROCARDIOGRAM COMPLETE: CPT | Performed by: INTERNAL MEDICINE

## 2024-07-10 PROCEDURE — 3074F SYST BP LT 130 MM HG: CPT | Performed by: INTERNAL MEDICINE

## 2024-07-10 PROCEDURE — 3078F DIAST BP <80 MM HG: CPT | Performed by: INTERNAL MEDICINE

## 2024-07-10 PROCEDURE — 99214 OFFICE O/P EST MOD 30 MIN: CPT | Performed by: INTERNAL MEDICINE

## 2024-07-10 NOTE — PROGRESS NOTES
[facility] Daily Progress Note        CC: s/p RCA stent for stemi October 2022.    Subjective:  Mr. Eisenberg feels good and is back to work.  No chest pains doing better.  Smokes.  No chest pains.    7/10/24:  no chest pains.  No SOB.    Objective:   /64   Pulse 70   Wt 85.1 kg (187 lb 9.6 oz)   BMI 27.70 kg/m²   No intake or output data in the 24 hours ending 07/10/24 1024    TELEMETRY: Sinus    Physical Exam:  General:  Awake, alert, NAD  Eyes:  EOMI PERRL anicteric  Skin:  Warm and dry.   Neck:  JVP normal  Chest:  Diminshed.  No Rales.  Cardiovascular:  RRR, Normal S1S2.  No Murmur.  No Rub.  No Gallops.  Abdomen:  Soft NT  + bs  Extremities: No edema  Neuro:  CN II-XII intact.  No focal deficits.  No weakness.  No lateralizing findings.  Psych:  Normal thought and affect.    MSK:  No Cyanosis nor Clubbing.  Symmetrical strength upper and lower extremities      Assessment:  Patient Active Problem List    Diagnosis Date Noted    Tobacco use 10/08/2022    Hypokalemia 10/07/2022    Hyperglycemia 10/07/2022    Elevated blood pressure reading without diagnosis of hypertension 10/07/2022    Neutrophilic leukocytosis 10/07/2022    Acute ST elevation myocardial infarction (STEMI) involving other coronary artery of inferior wall ([facility]) 10/07/2022      Diagnosis Orders   1. HTN (hypertension), benign  EKG 12 Lead      2. Hyperlipidemia, unspecified hyperlipidemia type        3. Coronary artery disease involving native coronary artery of native heart without angina pectoris            Plan:  CAD:  s/p RCA stent.  HLP stable on lipitor  HTN controllled.  Renewed meds.  Continue current medications.  Stable cardiovascular status.      Core Measures:  Discharge instructions:   LVEF documented:   ACEI for LV dysfunction:   Smoking Cessation:    Gerber Proctor MD, MD 7/10/2024 10:24 AM

## 2025-01-20 RX ORDER — CLOPIDOGREL BISULFATE 75 MG/1
75 TABLET ORAL DAILY
Qty: 90 TABLET | Refills: 3 | Status: SHIPPED | OUTPATIENT
Start: 2025-01-20

## 2025-01-20 RX ORDER — LISINOPRIL 5 MG/1
5 TABLET ORAL DAILY
Qty: 90 TABLET | Refills: 3 | Status: SHIPPED | OUTPATIENT
Start: 2025-01-20

## 2025-01-20 NOTE — TELEPHONE ENCOUNTER
Requested Prescriptions     Pending Prescriptions Disp Refills    clopidogrel (PLAVIX) 75 MG tablet 90 tablet 3     Sig: Take 1 tablet by mouth daily            Checked Correct Pharmacy: Yes    Any changes since last refill? No     Number: 90  Refills: 3    Last OV: 7/10/2024 Provider: Dr.Tramuta HOROWITZ  Pt. Has not yet scheduled annual follow up.   Last Labs:   CBC:   Lab Results   Component Value Date    WBC 13.0 (H) 11/10/2022    HGB 15.1 11/10/2022    HCT 43.9 11/10/2022    MCV 94.4 11/10/2022     11/10/2022     Lipid:   Lab Results   Component Value Date    CHOL 95 11/10/2022    TRIG 63 11/10/2022    HDL 31 (L) 11/10/2022    LDL 51 11/10/2022    VLDL 13 11/10/2022     Magnesium:   Lab Results   Component Value Date/Time    MG 2.10 11/10/2022 09:19 AM      TSH:   Lab Results   Component Value Date    TSHFT4 1.20 11/10/2022

## 2025-01-20 NOTE — TELEPHONE ENCOUNTER
Requested Prescriptions     Pending Prescriptions Disp Refills    lisinopril (PRINIVIL;ZESTRIL) 5 MG tablet 90 tablet 3     Sig: Take 1 tablet by mouth daily            Checked Correct Pharmacy: Yes    Any changes since last refill? No     Number: 90  Refills: 3    Last OV: 7/10/2024 Provider: Dr.Tramuta HOROWITZ    Next OV: Pt. Has not yet scheduled follow up.   Last Labs:   CBC:   Lab Results   Component Value Date    WBC 13.0 (H) 11/10/2022    HGB 15.1 11/10/2022    HCT 43.9 11/10/2022    MCV 94.4 11/10/2022     11/10/2022     Lipid:   Lab Results   Component Value Date    CHOL 95 11/10/2022    TRIG 63 11/10/2022    HDL 31 (L) 11/10/2022    LDL 51 11/10/2022    VLDL 13 11/10/2022     Magnesium:   Lab Results   Component Value Date/Time    MG 2.10 11/10/2022 09:19 AM      TSH:   Lab Results   Component Value Date    TSHFT4 1.20 11/10/2022

## 2025-01-20 NOTE — TELEPHONE ENCOUNTER
Attempted to contact pt to see if her wanted to follow up with another cardiologist. Lvm for pt to return call. Received a request for refill on his metoprolol.

## 2025-01-21 RX ORDER — METOPROLOL SUCCINATE 25 MG/1
25 TABLET, EXTENDED RELEASE ORAL DAILY
Qty: 90 TABLET | Refills: 2 | Status: SHIPPED | OUTPATIENT
Start: 2025-01-21

## 2025-01-21 RX ORDER — ATORVASTATIN CALCIUM 80 MG/1
80 TABLET, FILM COATED ORAL NIGHTLY
Qty: 90 TABLET | Refills: 2 | Status: SHIPPED | OUTPATIENT
Start: 2025-01-21

## 2025-01-21 RX ORDER — ASPIRIN 81 MG/1
81 TABLET, CHEWABLE ORAL DAILY
Qty: 90 TABLET | Refills: 2 | Status: SHIPPED | OUTPATIENT
Start: 2025-01-21

## 2025-01-21 NOTE — TELEPHONE ENCOUNTER
Requested Prescriptions     Pending Prescriptions Disp Refills    aspirin 81 MG chewable tablet 90 tablet 3     Sig: Take 1 tablet by mouth daily    atorvastatin (LIPITOR) 80 MG tablet 90 tablet 3     Sig: Take 1 tablet by mouth nightly    metoprolol succinate (TOPROL XL) 25 MG extended release tablet 90 tablet 3     Sig: Take 1 tablet by mouth daily            Checked Correct Pharmacy: Yes    Any changes since last refill? No     Number: 90  Refills: 3    Last OV: 7/10/2024 Provider: Dr. Hitesh HOROWITZ    Next OV: Called and was unable to LVM for pt. To schedule follow up visit.   Last Labs:   CBC:   Lab Results   Component Value Date    WBC 13.0 (H) 11/10/2022    HGB 15.1 11/10/2022    HCT 43.9 11/10/2022    MCV 94.4 11/10/2022     11/10/2022     Liver:   Lab Results   Component Value Date    ALT 20 11/10/2022    AST 18 11/10/2022    ALKPHOS 148 (H) 11/10/2022    BILITOT 0.4 11/10/2022      Lipid:   Lab Results   Component Value Date    CHOL 95 11/10/2022    TRIG 63 11/10/2022    HDL 31 (L) 11/10/2022    LDL 51 11/10/2022    VLDL 13 11/10/2022     Magnesium:   Lab Results   Component Value Date/Time    MG 2.10 11/10/2022 09:19 AM      TSH:   Lab Results   Component Value Date    TSHFT4 1.20 11/10/2022         Contains abnormal data Vitamin D 25 Hydroxy  Order: [id number]  Status: Final result       Visible to patient: No (not released)       Next appt: None       Dx: Acute ST elevation myocardial infarct...    0 Result Notes      Component  Ref Range & Units 11/10/22 0919   Vit D, 25-Hydroxy  >=30 ng/mL 26.7 Low    Comment: <=20 ng/mL.............Deficient  21-29 ng/mL...........Insufficient  >=30 ng/mL..........Sufficient   Resulting Agency [facility]              Specimen Collected: 11/10/22 09:19 EST Last Resulted: 11/10/22 21:21 EST

## 2025-01-21 NOTE — TELEPHONE ENCOUNTER
Medication refill:    Medication  metoprolol succinate (TOPROL XL) 25 MG extended release tablet [[id number]]  metoprolol succinate (TOPROL XL) 25 MG extended release tablet [[id number]]    Order Details  Dose: 25 mg Route: Oral Frequency: DAILY   Dispense Quantity: 90 tablet Refills: 3          Sig: Take 1 tablet by mouth daily     Medication  atorvastatin (LIPITOR) 80 MG tablet [[id number]]  atorvastatin (LIPITOR) 80 MG tablet [[id number]]    Order Details  Dose: 80 mg Route: Oral Frequency: NIGHTLY   Dispense Quantity: 90 tablet Refills: 3          Sig: Take 1 tablet by mouth nightly     Medication  aspirin 81 MG chewable tablet [680]  aspirin 81 MG chewable tablet [[id number]]    Order Details  Dose: 81 mg Route: Oral Frequency: DAILY   Dispense Quantity: 90 tablet Refills: 3          Sig: Take 1 tablet by mouth daily     Pharmacy  [address] - P [phone] - F [phone]  [address] [facility] [address]  Phone: [phone]  Fax: [phone]     Last visit: 7/10/24  Next visit: not scheduled  Refill: 12/18/23